# Patient Record
Sex: FEMALE | Race: OTHER | Employment: PART TIME | URBAN - METROPOLITAN AREA
[De-identification: names, ages, dates, MRNs, and addresses within clinical notes are randomized per-mention and may not be internally consistent; named-entity substitution may affect disease eponyms.]

---

## 2017-02-15 ENCOUNTER — HOSPITAL ENCOUNTER (OUTPATIENT)
Dept: MAMMOGRAPHY | Age: 32
Discharge: HOME | End: 2017-02-15
Payer: SUBSIDIZED

## 2017-02-15 DIAGNOSIS — N63.10 BREAST MASS, RIGHT: ICD-10-CM

## 2017-02-15 PROCEDURE — 76642 ULTRASOUND BREAST LIMITED: CPT

## 2017-03-07 LAB
ANTIBODY SCREEN, EXTERNAL: NEGATIVE
HBSAG, EXTERNAL: NEGATIVE
HIV, EXTERNAL: NON REACTIVE
RUBELLA, EXTERNAL: NORMAL
T. PALLIDUM, EXTERNAL: NEGATIVE
TYPE, ABO & RH, EXTERNAL: NORMAL

## 2017-06-20 LAB
HCT, EXTERNAL: 35.9
HGB, EXTERNAL: 11.7
PLATELET CNT,   EXTERNAL: 260

## 2017-07-19 ENCOUNTER — HOSPITAL ENCOUNTER (OUTPATIENT)
Age: 32
Setting detail: OBSERVATION
Discharge: HOME | End: 2017-07-19
Attending: OBSTETRICS & GYNECOLOGY | Admitting: OBSTETRICS & GYNECOLOGY
Payer: MEDICAID

## 2017-07-19 VITALS
BODY MASS INDEX: 34.02 KG/M2 | TEMPERATURE: 97.9 F | HEART RATE: 113 BPM | DIASTOLIC BLOOD PRESSURE: 61 MMHG | WEIGHT: 186 LBS | RESPIRATION RATE: 16 BRPM | SYSTOLIC BLOOD PRESSURE: 119 MMHG

## 2017-07-19 PROBLEM — M54.9 BACK PAIN AFFECTING PREGNANCY: Status: ACTIVE | Noted: 2017-07-19

## 2017-07-19 PROBLEM — O99.891 BACK PAIN AFFECTING PREGNANCY: Status: ACTIVE | Noted: 2017-07-19

## 2017-07-19 LAB
APPEARANCE UR: CLEAR
BACTERIA URNS QL MICRO: NEGATIVE /HPF
BILIRUB UR QL: NEGATIVE
COLOR UR: ABNORMAL
EPITH CASTS URNS QL MICRO: ABNORMAL /LPF
GLUCOSE UR STRIP.AUTO-MCNC: NEGATIVE MG/DL
HGB UR QL STRIP: NEGATIVE
HYALINE CASTS URNS QL MICRO: ABNORMAL /LPF (ref 0–5)
KETONES UR QL STRIP.AUTO: ABNORMAL MG/DL
LEUKOCYTE ESTERASE UR QL STRIP.AUTO: NEGATIVE
NITRITE UR QL STRIP.AUTO: NEGATIVE
PH UR STRIP: 6 [PH] (ref 5–8)
PROT UR STRIP-MCNC: NEGATIVE MG/DL
RBC #/AREA URNS HPF: ABNORMAL /HPF (ref 0–5)
SP GR UR REFRACTOMETRY: 1.02 (ref 1–1.03)
UA: UC IF INDICATED,UAUC: ABNORMAL
UROBILINOGEN UR QL STRIP.AUTO: 0.2 EU/DL (ref 0.2–1)
WBC URNS QL MICRO: ABNORMAL /HPF (ref 0–4)

## 2017-07-19 PROCEDURE — 81001 URINALYSIS AUTO W/SCOPE: CPT | Performed by: OBSTETRICS & GYNECOLOGY

## 2017-07-19 PROCEDURE — 99284 EMERGENCY DEPT VISIT MOD MDM: CPT

## 2017-07-19 RX ORDER — TRIAMCINOLONE ACETONIDE 55 UG/1
2 SPRAY, METERED NASAL DAILY
COMMUNITY
End: 2020-02-13

## 2017-07-19 NOTE — H&P
EDC:2017  EGA: 32 weeks, 4 days      CC:  Cramping and back pain. History of Present Illness:  Pt is a 28 yo  at 32+4 presenting with lower abdominal cramping and back pain that has been going on all day. She reports that she feels midline lower back pain and that she feels lower abdominal cramping that has been occurring ever 10-15 mins since this morning. She reports that it has not gotten any worse and seems to be exacerbated by position changes. She reports +FM, no VB, LOF. She denies any urinary symptoms. Pregnancy has otherwise been uncomplicated. Patient's Prenatal Care with Doctor of Record Reyes Acevedo MD Notable For -     lab screening  Normal pregnancy multigravida          Impression & Recommendations:    Problem # 1:  Back pain in pregnancy (IYP-828.40) (QJU56-S36.89)  Reviewed suspect round ligament pain + dehydration. Cervix closed. UA neg except trace ketones  NST reactive, acontractile  Discharge home. Orders:  Fetal non-stress test (CPT-15348I)      Other Orders:  L&D Outpatient Obs - Medium (CPT-AdmitF)  NST in Hospital (CPT-AdmitF)      Past Pregnancy History      :  5     Term Births:  4     Premature Births: 0     Living Children: 4     Para:   4     Mult. Births:  0     Prev : 0     Aborta:  0     Elect. Ab:  0     Spont.  Ab:  0     Ectopics:  0    Pregnancy # 1     Delivery date:   2003     Weeks Gestation: 40     Delivery type:        Delivery location:   [facility]      Infant Sex:  Female    Pregnancy # 2     Delivery date:   2005     Weeks Gestation: 40     Delivery type:        Delivery location:   [facility]     Infant Sex:  Female    Pregnancy # 3     Delivery date:   01/15/2008     Weeks Gestation: 36     Delivery type:        Delivery location:   [facility]     Infant Sex:  Female    Pregnancy # 4     Delivery date:   2013     Weeks Gestation: 40     Delivery type:        Delivery location:   [facility] Infant Sex:  Female    Pregnancy # 5     Comments:  current         Past Medical History:     Reviewed history from 03/07/2017 and no changes required:        Seasonal allergies     Past Surgical History:     Reviewed history from 03/07/2017 and no changes required:        Right breast surgery- cyst removed 2015     Family History Summary:      Reviewed history Last on 04/04/2017 and no changes required:07/19/2017  Father (Angelica Trinidad) - Has Family History of Diabetes - Entered On: 3/7/2017    General Comments - FH:  Family history transferred to [address] [place] [address]     Social History:     Reviewed history from 03/07/2017 and no changes required:                Working at "[employer]       Previous Tobacco Use: Signed On - 03/07/2017  Smoked Tobacco Use:  Never smoker  Smokeless Tobacco Use:  Never  Passive smoke exposure:  no  Drug use:  no  Caffeine use:  1 drinks per day    Previous Alcohol Use: Signed On - 03/07/2017  Alcohol use:  no  Exercise:  no  Seatbelt use:  100 %  Sun Exposure:  rarely    PAP Smear History:     Date of Last PAP Smear:  03/07/2017      Review of Systems        See HPI    Except as noted in the HPI, the review of systems is negative for General, Breast, , Resp, GI, Endo, MS, Psych and Heme.     Allergies      Medications Removed from Medication List        Flowsheet View for Follow-up Visit     Estimated weeks of        gestation:  32 4/7     Weight:  187     Blood pressure: 110 / 74     Cx Dilation:  0     Cx Effacement: 0%     Cx Station:  high     Comment:  R/O PTL      Vital Signs    Blood Pressure: 110 / 74  NST:   reactive     Height:   61.5 inches  Weight:  187 pounds      Physical Exam     General           General appearance:  no acute distress    Head           Inspection:   normal    Eyes           External:   EOM intact    ENT           Dental:   adequate dentition    Chest           Lungs:  clear to auscultation          Heart:  regular rate and rhythm    Extremeties Extremeties:  0 edema    Neurological           Reflexes:  2+ and symmetric with no pathological reflexes    Psych           Orientation:  oriented to time, place, and person          Mood:  no appearance of anxiety, depression, or agitation    Lymph           Inguinal:  no inguinal adenopathy    Skin           Inspection:  no rashes, suspicious lesions, or ulcerations    Abdomen           Abdomen:  gravid    Pelvic Exam           EGBUS:  no lesions          Vagina:  normal appearing without lesions or discharge          Uterus:  gravid          Cervix:  no lesions or discharge                  Dilation: : 0                  Effacement:  0%                  Station:  high            Impression & Recommendations:    Problem # 1:  Back pain in pregnancy (QGY-278.32) (CMG02-K97.89)  Reviewed suspect round ligament pain + dehydration. Cervix closed. UA neg except trace ketones  NST reactive, acontractile  Discharge home. Orders:  Fetal non-stress test ([facility]-83914T)      Other Orders:  L&D Outpatient Obs - Medium (CPT-AdmitF)  NST in Hospital (CPT-AdmitF)      Medications (at conclusion of this visit)    07/05/2017 BREAST PUMP MISC (MISC. DEVICES) use as directed to maintain milk supply postpartum; lactating  03/07/2017 ZOFRAN 8 MG TABS (ONDANSETRON HCL) 1 tablet every 8 hours by mouth as needed for nausea and vomiting  03/07/2017 NASACORT ALLERGY 24HR AERO (TRIAMCINOLONE ACETONIDE AERO)           LABORATORY DATA   TEST DATE RESULT   Group B Strep culture                                     (Group B Strep Culture Result Field)   Blood Type 03/07/2017 A                                             (Blood Type Result Field)   Rh 03/07/2017 Positive                                   (Rh Result Field)   Rhogam Inj Given     Tdap Vaccine Given 06/20/2017 Vacc.  [facility]   Antibody Screen 06/20/2017 Negative   Rubella  [facility] Reference Ranges On or After 3/10/14                  <0.90              Non-immune      0.90 - 0.99 Equivocal      >0.99              Immune    [facility] Reference Ranges  Before 3/10/14           <5                 Non-immune             5 - 9               Equivocal            >9                 Immune  [facility] Reference Ranges       < Or = 0.90       Negative             0.91-1.09          Equivocal            > Or = 1.10       Positive   03/07/2017     1.91     TPA (T Pallidum Antibodies) 06/20/2017 Negative   Serology (RPR)     HBsAg 03/07/2017 Negative   HIV 03/07/2017 Non Reactive   Hemoglobin 06/20/2017 11.7   Hematocrit 06/20/2017 35.9   Platelets 98/37/4941 260 X10E3/UL   TSH     Urine Culture 03/07/2017 Negative   GC DNA Probe 03/07/2017 Negative   Chlamydia DNA 03/07/2017 Negative   PAP 03/07/2017 NIL   Flu Vaccine Given 03/07/2017 Vacc. [facility]   HGBA1C     HGB Electro 03/07/2017 N/A   T4, Free     BG Fasting     GTT 1H 50G 06/20/2017 93   GTT 1H 100G     GTT 2H 100G     GTT 3H 100G     Glucose Plasma     CF Accept or Decline 03/07/2017 declined   CF Screen Result     Nuchal Trans 05/02/2017 3.43^3. 43 mm&millimeters   AFP Only 04/12/2017 *Screen Negative*   Tetra     AFP Serum     CVS 04/04/2017 declined   AFP Amniotic     Amnio Karyo 04/04/2017 declined   FISH     GC Culture     Chlamydia Cult     Ureaplasma     Mycoplasma     WBC 03/07/2017 9.1 X10E3/UL   RBC 03/07/2017 4.67 X10E6/UL   MCV 03/07/2017 86   MCH 03/07/2017 28.7   MCHC RBC 03/07/2017 33.4     ULTRASOUND DATA   TEST DATE RESULT   Estimated Fetal Weight 05/02/2017 302.49069236^947 g&grams                                     Weight % 05/02/2017 59^59% %&percent                                                BRYAN                      BPP     Cervical Length (mm)         Fetal Surveillance      NST Fetus A  An NST was performed and was reactive. The baseline FHR was 135. Moderate variablity was present. multiple accelerations of sufficient amplitude and duration were noted. Acontractile.          Electronically signed by Vinod Rosales MD on 07/19/2017 at 6:22 PM    ________________________________________________________________________

## 2017-07-19 NOTE — IP AVS SNAPSHOT
[address] 
[phone] Patient: Ewa Miranda MRN: [id number] VFQ:00/54/0064 You are allergic to the following No active allergies Recent Documentation Weight BMI OB Status Smoking Status 84.4 kg 34.02 kg/m2 Pregnant Never Smoker Emergency Contacts Name Discharge Info Relation Home Work Mobile Kelsie Petty CAREGIVER [3] Spouse [3] [phone] About your hospitalization You were admitted on:  N/A You last received care in the:  [facility] 3 LABOR & DELIVERY You were discharged on:  July 19, 2017 Unit phone number:  [phone] Why you were hospitalized Your primary diagnosis was:  Not on File Your diagnoses also included:  Back Pain Affecting Pregnancy Providers Seen During Your Hospitalizations Provider Role Specialty Primary office phone Yovana Sheppard MD Attending Provider Obstetrics & Gynecology [phone] Your Primary Care Physician (PCP) Primary Care Physician Office Phone Office Fax Ziggy Holder [phone] Follow-up Information Follow up With Details Comments Contact Info Guero Franco MD   [address] [facility] [address] [facility] 
[phone] In 1 week For follow up Current Discharge Medication List  
  
CONTINUE these medications which have NOT CHANGED Dose & Instructions Dispensing Information Comments Morning Noon Evening Bedtime PNV#16-Iron Fum & PS-FA-OM-3 35-1-200 mg Cap Your last dose was: Your next dose is: Take  by mouth. Refills:  0  
     
   
   
   
  
 triamcinolone 55 mcg nasal inhaler Commonly known as:  NASACORT AQ Your last dose was: Your next dose is:    
   
   
 Dose:  2 Spray 2 Sprays daily. Refills:  0 Discharge Instructions  DISCHARGE INSTRUCTIONS Name: Adolfo Solares YOB: 1985 Primary Diagnosis: Active Problems: 
  Back pain affecting pregnancy (2017) Introduction: You have visited the hospital because you thought you were in  labor. These guidelines are for your information at home to help prevent repeated problems. In general, you should remember: 
? Empty your bladder every 2-3 hours. ? Avoid breast stimulation (including showers where the water stream is on your breasts)-this can cause contractions. ? Rest means lying down. ? Contractions and cramping happen more often in evening and nighttime. ? No intercourse or sexual stimulation without asking your doctor. ? Try to arrange for help with housework and . Diet/Diet Restrictions:   
 
Anything you like/tolerate Physical Activity / Restrictions / Safety:  
 
* Activity at home is based on how strong your  labor has been, You should follow the following activity guidelines. As tolerated, avoid heavy lifting. Discharge Instructions/ Special Treatment/ Home Care Needs:  
 
Call your provider if: 
? Uterine cramping (menstrual-like cramps, intermittent or constant ? Uterine contractions every 10-15 minutes or more frequently ? Low abdominal pressure ( pelvic pressure) ? Dull low backache (intermittent or constant) ? Increase or change in vaginal discharge ? Feeling that the baby is \"pushing down\" ? Abdominal cramping with or without diarrhea If any of these symptoms are experienced, stop what you are doing, lie down on your side, drink two to three glasses of water and wait one hour. If the symptoms persist or get worse, call your provider.  
 
Pain Management:  
 
 
 
 
Signed By: Levi Castro MD                                                                                                   Date: 2017 Time: 6:24 PM 
 
 Discharge Checklist-NURSING TO COMPLETE:  
 
Date and Time of Discharge: Date: 7/19/2017 Time: 6:24 PM 
 
Return of:  
Dental Appliance: Dental Appliances: None Vision: Visual Aid: Glasses Hearing Aid:   
Jewelry: Jewelry: Earrings, Yolande Fujita, Ring, Watch Clothing: Clothing: At bedside, With patient Other Valuables:   
Valuables sent to safe:   
 
Prescription Given: no 
Medication Instruction Sheet(s), including side effects, provided: no 
 
Accompanied By: Family Mode of Transportation: 
 
Discharge Disposition: Home I have had the opportunity to make my options or choices for discharge. I have received and understand these instructions. These are general instructions for a healthy lifestyle: No smoking/ No tobacco products/ Avoid exposure to second hand smoke Surgeon General's Warning:  Quitting smoking now greatly reduces serious risk to your health. Obesity, smoking, and sedentary lifestyle greatly increases your risk for illness A healthy diet, regular physical exercise & weight monitoring are important for maintaining a healthy lifestyle Recognize signs and symptoms of STROKE: 
 
F-face looks uneven A-arms unable to move or move unevenly S-speech slurred or non-existent T-time-call 911 as soon as signs and symptoms begin-DO NOT go Back to bed or wait to see if you get better-TIME IS BRAIN. Weeks 32 to 34 of Your Pregnancy: Care Instructions Your Care Instructions During the last few weeks of your pregnancy, you may have more aches and pains. It's important to rest when you can. Your growing baby is putting more pressure on your bladder. So you may need to urinate more often. Hemorrhoids are also common. These are painful, itchy veins in the rectal area. In the 36th week, most women have a test for group B streptococcus (GBS). GBS is a common bacteria that can live in the vagina and rectum.  It can make your baby sick after birth. If you test positive, you will get antibiotics during labor. These will keep your baby from getting the bacteria. You may want to talk with your doctor about banking your baby's umbilical cord blood. This is the blood left in the cord after birth. If you want to save this blood, you must arrange it ahead of time. You can't decide at the last minute. If you haven't already had the Tdap shot during this pregnancy, talk to your doctor about getting it. It will help protect your  against pertussis infection. Follow-up care is a key part of your treatment and safety. Be sure to make and go to all appointments, and call your doctor if you are having problems. It's also a good idea to know your test results and keep a list of the medicines you take. How can you care for yourself at home? Ease hemorrhoids · Get more liquids, fruits, vegetables, and fiber in your diet. This will help keep your stools soft. · Avoid sitting for too long. Lie on your left side several times a day. · Clean yourself with soft, moist toilet paper. Or you can use witch hazel pads or personal hygiene pads. · If you are uncomfortable, try ice packs. Or you can sit in a warm sitz bath. Do these for 20 minutes at a time, as needed. · Use hydrocortisone cream for pain and itching. Two examples are Anusol and Preparation H Hydrocortisone. · Ask your doctor about taking an over-the-counter stool softener. Consider breastfeeding · Experts recommend that women breastfeed for 1 year or longer. Breast milk is the perfect food for babies. · Breast milk is easier for babies to digest than formula. And it is always available, just the right temperature, and free. · In general, babies who are  are healthier than formula-fed babies. ¨  babies are less likely to get ear infections, colds, diarrhea, and pneumonia.  
¨  babies who are fed only breast milk are less likely to get asthma and allergies. ¨  babies are less likely to be obese. ¨  babies are less likely to get diabetes or heart disease. · Women who breastfeed have less bleeding after the birth. Their uteruses also shrink back faster. · Some women who breastfeed lose weight faster. Making milk burns calories. · Breastfeeding can lower your risk of breast cancer, ovarian cancer, and osteoporosis. Decide about circumcision for boys · As you make this decision, it may help to think about your personal, Cheondoism, and family traditions. You get to decide if you will keep your son's penis natural or if he will be circumcised. · If you decide that you would like to have your baby circumcised, talk with your doctor. You can share your concerns about pain. And you can discuss your preferences for anesthesia. Where can you learn more? Go to http://ariadne-gwyn.info/. Enter E250 in the search box to learn more about \"Weeks 32 to 34 of Your Pregnancy: Care Instructions. \" Current as of: March 16, 2017 Content Version: 11.3 © 4811-4452 [employer]. Care instructions adapted under license by [employer] (which disclaims liability or warranty for this information). If you have questions about a medical condition or this instruction, always ask your healthcare professional. [address] any warranty or liability for your use of this information. Discharge Orders None [employer] Announcement We are excited to announce that we are making your provider's discharge notes available to you in [employer]. You will see these notes when they are completed and signed by the physician that discharged you from your recent hospital stay.   If you have any questions or concerns about any information you see in [employer], please call the Health Information Department where you were seen or reach out to your Primary Care Provider for more information about your plan of care. Introducing [facility]! Jono Galdinosabine introduces Billetto patient portal. Now you can access parts of your medical record, email your doctor's office, and request medication refills online. 1. In your internet browser, go to [email or web address] 2. Click on the First Time User? Click Here link in the Sign In box. You will see the New Member Sign Up page. 3. Enter your Billetto Access Code exactly as it appears below. You will not need to use this code after youve completed the sign-up process. If you do not sign up before the expiration date, you must request a new code. · Billetto Access Code: [email or web address] Expires: 10/17/2017  6:30 PM 
 
4. Enter the last four digits of your Social Security Number (xxxx) and Date of Birth (mm/dd/yyyy) as indicated and click Submit. You will be taken to the next sign-up page. 5. Create a Billetto ID. This will be your Billetto login ID and cannot be changed, so think of one that is secure and easy to remember. 6. Create a Billetto password. You can change your password at any time. 7. Enter your Password Reset Question and Answer. This can be used at a later time if you forget your password. 8. Enter your e-mail address. You will receive e-mail notification when new information is available in [address] 9. Click Sign Up. You can now view and download portions of your medical record. 10. Click the Download Summary menu link to download a portable copy of your medical information. If you have questions, please visit the Frequently Asked Questions section of the Billetto website. Remember, B[employer]to is NOT to be used for urgent needs. For medical emergencies, dial 911. Now available from your iPhone and Android! General Information Please provide this summary of care documentation to your next provider.  
  
  
    
    
 Patient Signature: ____________________________________________________________ Date:  ____________________________________________________________  
  
Sigmund [place] Provider Signature:  ____________________________________________________________ Date:  ____________________________________________________________

## 2017-07-19 NOTE — DISCHARGE SUMMARY
Antepartum  Discharge Summary     Patient ID:  Larence Gitelman  [id number]  89 y.o.  1985    Admit date: 7/19/2017    Discharge date: 7/19/2017    Admission Diagnoses:    Patient Active Problem List   Diagnosis Code    Fibroadenoma of right breast D24.1    Mastodynia N64.4    Back pain affecting pregnancy O26.899, M54.9       Discharge Diagnoses: There are no discharge diagnoses documented for the most recent discharge. Patient Active Problem List   Diagnosis Code    Fibroadenoma of right breast D24.1    Mastodynia N64.4    Back pain affecting pregnancy O26.899, M54.9       Procedures for this admission: UA, NST    Hospital Course: undelivered    Disposition: Home or self care    Discharged Condition: stable            Patient Instructions:   Current Discharge Medication List      CONTINUE these medications which have NOT CHANGED    Details   triamcinolone (NASACORT AQ) 55 mcg nasal inhaler 2 Sprays daily. PNV#16-Iron Fum & PS-FA-OM-3 35-1-200 mg cap Take  by mouth. Activity: Activity as tolerated  Diet: Regular Diet    Follow-up with   Follow-up Appointments   Procedures    FOLLOW UP VISIT Appointment in: One Week     Standing Status:   Standing     Number of Occurrences:   1     Order Specific Question:   Appointment in     Answer:    One Week        Signed:  Sophia Fowler MD  7/19/2017  6:25 PM

## 2017-07-19 NOTE — PROGRESS NOTES
[id number]:  # [id number] for patient history and admission requirements. Patient complaining of urinary frequency and burning with urination. 1745: UA sent to lab.    1816: Dr. Robles Pena at the bedside assessing patient and [address]. SVE closed/high. Plan is to discharge patient    [phone]: Patient discharged. All questions answered. Encouraged patient to drink lots of fluids and follow up with Dr. Marck Lima in 1 week.

## 2017-07-19 NOTE — DISCHARGE INSTRUCTIONS
DISCHARGE INSTRUCTIONS    Name: Claudia Rosales  YOB: 1985  Primary Diagnosis: Active Problems:    Back pain affecting pregnancy (2017)        Introduction: You have visited the hospital because you thought you were in  labor. These guidelines are for your information at home to help prevent repeated problems. In general, you should remember:   Empty your bladder every 2-3 hours.  Avoid breast stimulation (including showers where the water stream is on your breasts)-this can cause contractions.  Rest means lying down.  Contractions and cramping happen more often in evening and nighttime.  No intercourse or sexual stimulation without asking your doctor.  Try to arrange for help with housework and . Diet/Diet Restrictions:      Anything you like/tolerate    Physical Activity / Restrictions / Safety:     * Activity at home is based on how strong your  labor has been, You should follow the following activity guidelines. As tolerated, avoid heavy lifting. Discharge Instructions/ Special Treatment/ Home Care Needs:     Call your provider if:   Uterine cramping (menstrual-like cramps, intermittent or constant   Uterine contractions every 10-15 minutes or more frequently   Low abdominal pressure ( pelvic pressure)   Dull low backache (intermittent or constant)   Increase or change in vaginal discharge   Feeling that the baby is \"pushing down\"   Abdominal cramping with or without diarrhea  If any of these symptoms are experienced, stop what you are doing, lie down on your side, drink two to three glasses of water and wait one hour. If the symptoms persist or get worse, call your provider.     Pain Management:           Signed By: Chino Parisi MD                                                                                                   Date: 2017 Time: 6:24 PM    Discharge Checklist-NURSING TO COMPLETE:     Date and Time of Discharge: Date: 7/19/2017 Time: 6:24 PM    Return of:   Dental Appliance: Dental Appliances: None  Vision: Visual Aid: Glasses  Hearing Aid:    Jewelry: Jewelry: Earrings, Necklace, Ring, Watch  Clothing: Clothing: At bedside, With patient  Other Valuables:    Valuables sent to safe:      Prescription Given: no  Medication Instruction Sheet(s), including side effects, provided: no    Accompanied By: Family    Mode of Transportation:    Discharge Disposition: Home    I have had the opportunity to make my options or choices for discharge. I have received and understand these instructions. These are general instructions for a healthy lifestyle:    No smoking/ No tobacco products/ Avoid exposure to second hand smoke    Surgeon General's Warning:  Quitting smoking now greatly reduces serious risk to your health. Obesity, smoking, and sedentary lifestyle greatly increases your risk for illness    A healthy diet, regular physical exercise & weight monitoring are important for maintaining a healthy lifestyle    Recognize signs and symptoms of STROKE:    F-face looks uneven    A-arms unable to move or move unevenly    S-speech slurred or non-existent    T-time-call 911 as soon as signs and symptoms begin-DO NOT go       Back to bed or wait to see if you get better-TIME IS BRAIN. Weeks 32 to 34 of Your Pregnancy: Care Instructions  Your Care Instructions    During the last few weeks of your pregnancy, you may have more aches and pains. It's important to rest when you can. Your growing baby is putting more pressure on your bladder. So you may need to urinate more often. Hemorrhoids are also common. These are painful, itchy veins in the rectal area. In the 36th week, most women have a test for group B streptococcus (GBS). GBS is a common bacteria that can live in the vagina and rectum. It can make your baby sick after birth. If you test positive, you will get antibiotics during labor.  These will keep your baby from getting the bacteria. You may want to talk with your doctor about banking your baby's umbilical cord blood. This is the blood left in the cord after birth. If you want to save this blood, you must arrange it ahead of time. You can't decide at the last minute. If you haven't already had the Tdap shot during this pregnancy, talk to your doctor about getting it. It will help protect your  against pertussis infection. Follow-up care is a key part of your treatment and safety. Be sure to make and go to all appointments, and call your doctor if you are having problems. It's also a good idea to know your test results and keep a list of the medicines you take. How can you care for yourself at home? Ease hemorrhoids  · Get more liquids, fruits, vegetables, and fiber in your diet. This will help keep your stools soft. · Avoid sitting for too long. Lie on your left side several times a day. · Clean yourself with soft, moist toilet paper. Or you can use witch hazel pads or personal hygiene pads. · If you are uncomfortable, try ice packs. Or you can sit in a warm sitz bath. Do these for 20 minutes at a time, as needed. · Use hydrocortisone cream for pain and itching. Two examples are Anusol and Preparation H Hydrocortisone. · Ask your doctor about taking an over-the-counter stool softener. Consider breastfeeding  · Experts recommend that women breastfeed for 1 year or longer. Breast milk is the perfect food for babies. · Breast milk is easier for babies to digest than formula. And it is always available, just the right temperature, and free. · In general, babies who are  are healthier than formula-fed babies. ¨  babies are less likely to get ear infections, colds, diarrhea, and pneumonia. ¨  babies who are fed only breast milk are less likely to get asthma and allergies. ¨  babies are less likely to be obese.   ¨  babies are less likely to get diabetes or heart disease. · Women who breastfeed have less bleeding after the birth. Their uteruses also shrink back faster. · Some women who breastfeed lose weight faster. Making milk burns calories. · Breastfeeding can lower your risk of breast cancer, ovarian cancer, and osteoporosis. Decide about circumcision for boys  · As you make this decision, it may help to think about your personal, Church, and family traditions. You get to decide if you will keep your son's penis natural or if he will be circumcised. · If you decide that you would like to have your baby circumcised, talk with your doctor. You can share your concerns about pain. And you can discuss your preferences for anesthesia. Where can you learn more? Go to http://ariadne-gwyn.info/. Enter G380 in the search box to learn more about \"Weeks 32 to 34 of Your Pregnancy: Care Instructions. \"  Current as of: March 16, 2017  Content Version: 11.3  © 8842-3651 [email or web address]. Care instructions adapted under license by [employer] (which disclaims liability or warranty for this information). If you have questions about a medical condition or this instruction, always ask your healthcare professional. [place] [address] any warranty or liability for your use of this information.

## 2017-07-19 NOTE — IP AVS SNAPSHOT
[address] 
[phone] Patient: Mark Andrews MRN: [id number] YZE:[birth year] Current Discharge Medication List  
  
CONTINUE these medications which have NOT CHANGED Dose & Instructions Dispensing Information Comments Morning Noon Evening Bedtime PNV#16-Iron Fum & PS-FA-OM-3 35-1-200 mg Cap Your last dose was: Your next dose is: Take  by mouth. Refills:  0  
     
   
   
   
  
 triamcinolone 55 mcg nasal inhaler Commonly known as:  NASACORT AQ Your last dose was: Your next dose is:    
   
   
 Dose:  2 Spray 2 Sprays daily. Refills:  0

## 2017-08-10 ENCOUNTER — HOSPITAL ENCOUNTER (OUTPATIENT)
Age: 32
Setting detail: OBSERVATION
Discharge: HOME | End: 2017-08-10
Attending: OBSTETRICS & GYNECOLOGY | Admitting: OBSTETRICS & GYNECOLOGY
Payer: MEDICAID

## 2017-08-10 VITALS
TEMPERATURE: 97.9 F | BODY MASS INDEX: 34.23 KG/M2 | HEIGHT: 62 IN | DIASTOLIC BLOOD PRESSURE: 69 MMHG | SYSTOLIC BLOOD PRESSURE: 111 MMHG | WEIGHT: 186 LBS | RESPIRATION RATE: 16 BRPM | HEART RATE: 93 BPM

## 2017-08-10 PROBLEM — R10.9 ABDOMINAL PAIN DURING PREGNANCY IN THIRD TRIMESTER: Status: ACTIVE | Noted: 2017-08-10

## 2017-08-10 PROBLEM — O26.893 ABDOMINAL PAIN DURING PREGNANCY IN THIRD TRIMESTER: Status: ACTIVE | Noted: 2017-08-10

## 2017-08-10 PROCEDURE — 99284 EMERGENCY DEPT VISIT MOD MDM: CPT

## 2017-08-10 NOTE — IP AVS SNAPSHOT
[address] 
[phone] Patient: Lizett Puentes MRN: [id number] BMD:[birth year]/64[birth year]22 You are allergic to the following No active allergies Recent Documentation Height Weight Breastfeeding? BMI OB Status Smoking Status 1.575 m 84.4 kg No 34.02 kg/m2 Pregnant Never Smoker Emergency Contacts Name Discharge Info Relation Home Work Mobile Kellie Oliveros CAREGIVER [3] Spouse [3] [phone] About your hospitalization You were admitted on:  N/A You last received care in the:  [address] [place] [address] LABOR & DELIVERY You were discharged on:  August 10, 2017 Unit phone number:  [phone] Why you were hospitalized Your primary diagnosis was:  Not on File Your diagnoses also included:  Abdominal Pain During Pregnancy In Third Trimester Providers Seen During Your Hospitalizations Provider Role Specialty Primary office phone Tip Culp MD Attending Provider Obstetrics & Gynecology [phone] Your Primary Care Physician (PCP) Primary Care Physician Office Phone Office Fax Hima Brown [phone] Follow-up Information None Current Discharge Medication List  
  
ASK your doctor about these medications Dose & Instructions Dispensing Information Comments Morning Noon Evening Bedtime PNV#16-Iron Fum & PS-FA-OM-3 35-1-200 mg Cap Your last dose was: Your next dose is: Take  by mouth. Refills:  0  
     
   
   
   
  
 triamcinolone 55 mcg nasal inhaler Commonly known as:  NASACORT AQ Your last dose was: Your next dose is:    
   
   
 Dose:  2 Spray 2 Sprays daily. Refills:  0 Discharge Instructions Weeks 34 to 36 of Your Pregnancy: Care Instructions Your Care Instructions By now, your baby and your belly have grown quite large. It is almost time to give birth. A full-term pregnancy can deliver between 37 and 42 weeks. Your baby's lungs are almost ready to breathe air. The bones in your baby's head are now firm enough to protect it, but soft enough to move down through the birth canal. 
You may feel excited, happy, anxious, or scared. You may wonder how you will know if you are in labor or what to expect during labor. Try to be flexible in your expectations of the birth. Because each birth is different, there is no way to know exactly what childbirth will be like for you. This care sheet will help you know what to expect and how to prepare. This may make your childbirth easier. If you haven't already had the Tdap shot during this pregnancy, talk to your doctor about getting it. It will help protect your  against pertussis infection. In the 36th week, most women have a test for group B streptococcus (GBS). GBS is a common bacteria that can live in the vagina and rectum. It can make your baby sick after birth. If you test positive, you will get antibiotics during labor. The medicine will keep your baby from getting the bacteria. Follow-up care is a key part of your treatment and safety. Be sure to make and go to all appointments, and call your doctor if you are having problems. It's also a good idea to know your test results and keep a list of the medicines you take. How can you care for yourself at home? Learn about pain relief choices · Pain is different for every woman. Talk with your doctor about your feelings about pain. · You can choose from several types of pain relief. These include medicine or breathing techniques, as well as comfort measures. You can use more than one option. · If you choose to have pain medicine during labor, talk to your doctor about your options.  Some medicines lower anxiety and help with some of the pain. Others make your lower body numb so that you won't feel pain. · Be sure to tell your doctor about your pain medicine choice before you start labor or very early in your labor. You may be able to change your mind as labor progresses. · Rarely, a woman is put to sleep by medicine given through a mask or an IV. Labor and delivery · The first stage of labor has three parts: early, active, and transition. ¨ Most women have early labor at home. You can stay busy or rest, eat light snacks, drink clear fluids, and start counting contractions. ¨ When talking during a contraction gets hard, you may be moving to active labor. During active labor, you should head for the hospital if you are not there already. ¨ You are in active labor when contractions come every 3 to 4 minutes and last about 60 seconds. Your cervix is opening more rapidly. ¨ If your water breaks, contractions will come faster and stronger. ¨ During transition, your cervix is stretching, and contractions are coming more rapidly. ¨ You may want to push, but your cervix might not be ready. Your doctor will tell you when to push. · The second stage starts when your cervix is completely opened and you are ready to push. ¨ Contractions are very strong to push the baby down the birth canal. 
¨ You will feel the urge to push. You may feel like you need to have a bowel movement. ¨ You may be coached to push with contractions. These contractions will be very strong, but you will not have them as often. You can get a little rest between contractions. ¨ You may be emotional and irritable. You may not be aware of what is going on around you. ¨ One last push, and your baby is born. · The third stage is when a few more contractions push out the placenta. This may take 30 minutes or less. · The fourth stage is the welcome recovery. You may feel overwhelmed with emotions and exhausted but alert. This is a good time to start breastfeeding. Where can you learn more? Go to http://ariadne-gwyn.info/. Enter H514 in the search box to learn more about \"Weeks 34 to 36 of Your Pregnancy: Care Instructions. \" Current as of: March 16, 2017 Content Version: 11.3 © 6122-8573 [email or web address]. Care instructions adapted under license by [employer] (which disclaims liability or warranty for this information). If you have questions about a medical condition or this instruction, always ask your healthcare professional. [place] [address] any warranty or liability for your use of this information. DISCHARGE SUMMARY from Nurse The following personal items are in your possession at time of discharge: 
 
Dental Appliances: None Visual Aid: Glasses, With patient Home Medications: None Jewelry: Radha Asencio, With patient Clothing: Footwear, Undergarments, Shirt, Dress, With patient Other Valuables: Purse, With patient Personal Items Sent to Safe: none PATIENT INSTRUCTIONS: 
 
 
F-face looks uneven A-arms unable to move or move unevenly S-speech slurred or non-existent T-time-call 911 as soon as signs and symptoms begin-DO NOT go Back to bed or wait to see if you get better-TIME IS BRAIN. Warning Signs of HEART ATTACK Call 911 if you have these symptoms: 
? Chest discomfort. Most heart attacks involve discomfort in the center of the chest that lasts more than a few minutes, or that goes away and comes back. It can feel like uncomfortable pressure, squeezing, fullness, or pain. ? Discomfort in other areas of the upper body. Symptoms can include pain or discomfort in one or both arms, the back, neck, jaw, or stomach. ? Shortness of breath with or without chest discomfort. ? Other signs may include breaking out in a cold sweat, nausea, or lightheadedness. Don't wait more than five minutes to call [address]! Fast action can save your life. Calling 911 is almost always the fastest way to get lifesaving treatment. Emergency Medical Services staff can begin treatment when they arrive  up to an hour sooner than if someone gets to the hospital by car. The discharge information has been reviewed with the patient. The patient verbalized understanding. Discharge medications reviewed with the patient and appropriate educational materials and side effects teaching were provided. Discharge Orders None Introducing [facility]! [facility] introduces [employer] patient portal. Now you can access parts of your medical record, email your doctor's office, and request medication refills online. 1. In your internet browser, go to [email or web address] 2. Click on the First Time User? Click Here link in the Sign In box. You will see the New Member Sign Up page. 3. Enter your SweetPerk Access Code exactly as it appears below. You will not need to use this code after youve completed the sign-up process. If you do not sign up before the expiration date, you must request a new code. · SweetPerk Access Code: [email or web address] Expires: 10/17/2017  6:30 PM 
 
4. Enter the last four digits of your Social Security Number (xxxx) and Date of Birth (mm/dd/yyyy) as indicated and click Submit. You will be taken to the next sign-up page. 5. Create a SweetPerk ID. This will be your SweetPerk login ID and cannot be changed, so think of one that is secure and easy to remember. 6. Create a [employer]k password. You can change your password at any time. 7. Enter your Password Reset Question and Answer. This can be used at a later time if you forget your password. 8. Enter your e-mail address. You will receive e-mail notification when new information is available in [address] 9. Click Sign Up. You can now view and download portions of your medical record. 10. Click the Download Summary menu link to download a portable copy of your medical information. If you have questions, please visit the Frequently Asked Questions section of the [employer] website. Remember, [employer] is NOT to be used for urgent needs. For medical emergencies, dial 911. Now available from your iPhone and Android! General Information Please provide this summary of care documentation to your next provider. Patient Signature:  ____________________________________________________________ Date:  ____________________________________________________________  
  
Nigel Cart Provider Signature:  ____________________________________________________________ Date:  ____________________________________________________________

## 2017-08-10 NOTE — IP AVS SNAPSHOT
[address] 
[phone] Patient: Daksha Amezcua MRN: [id number] WYR:[birth year] Current Discharge Medication List  
  
ASK your doctor about these medications Dose & Instructions Dispensing Information Comments Morning Noon Evening Bedtime PNV#16-Iron Fum & PS-FA-OM-3 35-1-200 mg Cap Your last dose was: Your next dose is: Take  by mouth. Refills:  0  
     
   
   
   
  
 triamcinolone 55 mcg nasal inhaler Commonly known as:  NASACORT AQ Your last dose was: Your next dose is:    
   
   
 Dose:  2 Spray 2 Sprays daily. Refills:  0

## 2017-08-11 NOTE — H&P
Labor and Delivery Admission Note  8/10/2017    Patient is a 27 y.o.  at 35w5d who presents reporting cramping every 10-15 minutes this evening. She denies pain between cramps, vaginal bleeding, or leaking fluid. She also denies pain or odor with urination. She reports active fetal movement. She is overall comfortable and reports that she just wanted to make sure that the cramps were not a sign of  labor. PNC: Blood type: A            RH: pos            Hep B: neg             Rubella: immune            GBS status: unknown    OBHX:   OB History      Para Term  AB Living    5         SAB TAB Ectopic Molar Multiple Live Births                 Obstetric Comments    Menarche:  14. LMP: 2014. # of Children:  4. Age at Delivery of First Child:  20.   Hysterectomy/oophorectomy:  NO/NO. Breast Bx:  yes. Hx of Breast Feeding:  yes. BCP:  yes. Hormone therapy:  no. PMH:   Past Medical History:   Diagnosis Date    Breast mass, right 2015       PSH:   Past Surgical History:   Procedure Laterality Date    HX BREAST BIOPSY Right 2015    RIGHT BREAST EXCISIONAL BIOPSY W ULTRASOUND  performed by Michael Bojorquez MD at [address] BREAST BIOPSY Right     benign       OB/GYN: Brenna Leonardo    Meds:   Prior to Admission Medications   Prescriptions Last Dose Informant Patient Reported? Taking? PNV#16-Iron Fum & PS-FA-OM-3 35-1-200 mg cap 8/3/2017 at Unknown time  Yes Yes   Sig: Take  by mouth.   triamcinolone (NASACORT AQ) 55 mcg nasal inhaler 8/10/2017 at 2000  Yes Yes   Si Sprays daily.       Facility-Administered Medications: None       Allergies: No Known Allergies    Pertinent ROS: Review of Systems - General ROS: negative  Respiratory ROS: negative  Cardiovascular ROS: negative  Gastrointestinal ROS: no abdominal pain, change in bowel habits, or black or bloody stools  Genito-Urinary ROS: negative    FMH:   Family History   Problem Relation Age of Onset    Heart Attack Father        SH:   Social History     Social History    Marital status:      Spouse name: N/A    Number of children: N/A    Years of education: N/A     Occupational History    Not on file. Social History Main Topics    Smoking status: Never Smoker    Smokeless tobacco: Never Used    Alcohol use No    Drug use: No    Sexual activity: Yes     Partners: Male     Birth control/ protection: Injection     Other Topics Concern    Not on file     Social History Narrative       OBJECTIVE:    Temp (24hrs), Av.9 °F (36.6 °C), Min:97.9 °F (36.6 °C), Max:97.9 °F (36.6 °C)    Visit Vitals    /69    Pulse 93    Temp 97.9 °F (36.6 °C)    Resp 16    Ht 5' 2\" (1.575 m)    Wt 84.4 kg (186 lb)    Breastfeeding No    BMI 34.02 kg/m2       FHR baseline 140, + accels, moderate variability  Faith occasional contractions    Exam:  General: alert  HEENT:  normal   Abdomen:  Soft, non-tender  Cervix:  Closed/thick/high  Fluid: none present  Pelvimetry:  AP-good                      Arch- adequate                      Sidewalls- adequate                      Pelvis feels adequate for fetus. Extremities:  normal, no edema      Impression:  at 35w5d with occasional contractions; no evidence of  cervical dilation    Plan: PO hydration; once reactive NST is noted, patient can be discharged home with PTL and fetal movement precautions. F/u recommended as scheduled within 1-2 weeks.     Casi Walter MD  10:29 PM

## 2017-08-11 NOTE — DISCHARGE INSTRUCTIONS
Weeks 34 to 36 of Your Pregnancy: Care Instructions  Your Care Instructions    By now, your baby and your belly have grown quite large. It is almost time to give birth. A full-term pregnancy can deliver between 37 and 42 weeks. Your baby's lungs are almost ready to breathe air. The bones in your baby's head are now firm enough to protect it, but soft enough to move down through the birth canal.  You may feel excited, happy, anxious, or scared. You may wonder how you will know if you are in labor or what to expect during labor. Try to be flexible in your expectations of the birth. Because each birth is different, there is no way to know exactly what childbirth will be like for you. This care sheet will help you know what to expect and how to prepare. This may make your childbirth easier. If you haven't already had the Tdap shot during this pregnancy, talk to your doctor about getting it. It will help protect your  against pertussis infection. In the 36th week, most women have a test for group B streptococcus (GBS). GBS is a common bacteria that can live in the vagina and rectum. It can make your baby sick after birth. If you test positive, you will get antibiotics during labor. The medicine will keep your baby from getting the bacteria. Follow-up care is a key part of your treatment and safety. Be sure to make and go to all appointments, and call your doctor if you are having problems. It's also a good idea to know your test results and keep a list of the medicines you take. How can you care for yourself at home? Learn about pain relief choices  · Pain is different for every woman. Talk with your doctor about your feelings about pain. · You can choose from several types of pain relief. These include medicine or breathing techniques, as well as comfort measures. You can use more than one option. · If you choose to have pain medicine during labor, talk to your doctor about your options.  Some medicines lower anxiety and help with some of the pain. Others make your lower body numb so that you won't feel pain. · Be sure to tell your doctor about your pain medicine choice before you start labor or very early in your labor. You may be able to change your mind as labor progresses. · Rarely, a woman is put to sleep by medicine given through a mask or an IV. Labor and delivery  · The first stage of labor has three parts: early, active, and transition. ¨ Most women have early labor at home. You can stay busy or rest, eat light snacks, drink clear fluids, and start counting contractions. ¨ When talking during a contraction gets hard, you may be moving to active labor. During active labor, you should head for the hospital if you are not there already. ¨ You are in active labor when contractions come every 3 to 4 minutes and last about 60 seconds. Your cervix is opening more rapidly. ¨ If your water breaks, contractions will come faster and stronger. ¨ During transition, your cervix is stretching, and contractions are coming more rapidly. ¨ You may want to push, but your cervix might not be ready. Your doctor will tell you when to push. · The second stage starts when your cervix is completely opened and you are ready to push. ¨ Contractions are very strong to push the baby down the birth canal.  ¨ You will feel the urge to push. You may feel like you need to have a bowel movement. ¨ You may be coached to push with contractions. These contractions will be very strong, but you will not have them as often. You can get a little rest between contractions. ¨ You may be emotional and irritable. You may not be aware of what is going on around you. ¨ One last push, and your baby is born. · The third stage is when a few more contractions push out the placenta. This may take 30 minutes or less. · The fourth stage is the welcome recovery. You may feel overwhelmed with emotions and exhausted but alert.  This is a good time to start breastfeeding. Where can you learn more? Go to http://ariadne-gwyn.info/. Enter P064 in the search box to learn more about \"Weeks 34 to 36 of Your Pregnancy: Care Instructions. \"  Current as of: March 16, 2017  Content Version: 11.3  © 7149-6360 [employer]. Care instructions adapted under license by [employer] (which disclaims liability or warranty for this information). If you have questions about a medical condition or this instruction, always ask your healthcare professional. [address] any warranty or liability for your use of this information. DISCHARGE SUMMARY from Nurse    The following personal items are in your possession at time of discharge:    Dental Appliances: None  Visual Aid: Glasses, With patient     Home Medications: None  Jewelry: Ring, Earrings, With patient  Clothing: Footwear, Undergarments, Shirt, Dress, With patient  Other Valuables: Purse, With patient  Personal Items Sent to Safe: none          PATIENT INSTRUCTIONS:    After general anesthesia or intravenous sedation, for 24 hours or while taking prescription Narcotics:  · Limit your activities  · Do not drive and operate hazardous machinery  · Do not make important personal or business decisions  · Do  not drink alcoholic beverages  · If you have not urinated within 8 hours after discharge, please contact your surgeon on call.     Report the following to your surgeon:  · Excessive pain, swelling, redness or odor of or around the surgical area  · Temperature over 100.5  · Nausea and vomiting lasting longer than 4 hours or if unable to take medications  · Any signs of decreased circulation or nerve impairment to extremity: change in color, persistent  numbness, tingling, coldness or increase pain  · Any questions        What to do at Home:  Recommended activity: Activity as tolerated,     *  Please give a list of your current medications to your Primary Care Provider. *  Please update this list whenever your medications are discontinued, doses are      changed, or new medications (including over-the-counter products) are added. *  Please carry medication information at all times in case of emergency situations. These are general instructions for a healthy lifestyle:    No smoking/ No tobacco products/ Avoid exposure to second hand smoke    Surgeon General's Warning:  Quitting smoking now greatly reduces serious risk to your health. Obesity, smoking, and sedentary lifestyle greatly increases your risk for illness    A healthy diet, regular physical exercise & weight monitoring are important for maintaining a healthy lifestyle    You may be retaining fluid if you have a history of heart failure or if you experience any of the following symptoms:  Weight gain of 3 pounds or more overnight or 5 pounds in a week, increased swelling in our hands or feet or shortness of breath while lying flat in bed. Please call your doctor as soon as you notice any of these symptoms; do not wait until your next office visit. Recognize signs and symptoms of STROKE:    F-face looks uneven    A-arms unable to move or move unevenly    S-speech slurred or non-existent    T-time-call 911 as soon as signs and symptoms begin-DO NOT go       Back to bed or wait to see if you get better-TIME IS BRAIN. Warning Signs of HEART ATTACK     Call 911 if you have these symptoms:   Chest discomfort. Most heart attacks involve discomfort in the center of the chest that lasts more than a few minutes, or that goes away and comes back. It can feel like uncomfortable pressure, squeezing, fullness, or pain.  Discomfort in other areas of the upper body. Symptoms can include pain or discomfort in one or both arms, the back, neck, jaw, or stomach.  Shortness of breath with or without chest discomfort.    Other signs may include breaking out in a cold sweat, nausea, or lightheadedness. Don't wait more than five minutes to call 911 - MINUTES MATTER! Fast action can save your life. Calling 911 is almost always the fastest way to get lifesaving treatment. Emergency Medical Services staff can begin treatment when they arrive -- up to an hour sooner than if someone gets to the hospital by car. The discharge information has been reviewed with the patient. The patient verbalized understanding. Discharge medications reviewed with the patient and appropriate educational materials and side effects teaching were provided.

## 2017-08-11 NOTE — PROGRESS NOTES
2157 Pt admitted to LDR 2 with c/o painful ctxs for the last 3 hours. Denies leaking or bleeding. 550 [facility] Dr David Daigle at bedside to assess. 200 Pt ok to be d/c'd per Dr David Daigle. 2242 Pt given d/c instructions. Verbalized understanding. Left unit ambulatory with  and kids.

## 2017-08-15 LAB — GRBS, EXTERNAL: POSITIVE

## 2017-09-09 ENCOUNTER — HOSPITAL ENCOUNTER (EMERGENCY)
Age: 32
Discharge: HOME | End: 2017-09-09
Attending: OBSTETRICS & GYNECOLOGY | Admitting: OBSTETRICS & GYNECOLOGY
Payer: MEDICAID

## 2017-09-09 VITALS
SYSTOLIC BLOOD PRESSURE: 121 MMHG | TEMPERATURE: 98.4 F | BODY MASS INDEX: 36.25 KG/M2 | HEIGHT: 62 IN | DIASTOLIC BLOOD PRESSURE: 75 MMHG | WEIGHT: 197 LBS | RESPIRATION RATE: 16 BRPM | HEART RATE: 98 BPM

## 2017-09-09 LAB
A1 MICROGLOB PLACENTAL VAG QL: NEGATIVE
CONTROL LINE PRESENT?: YES
EXPIRATION DATE: NORMAL
INTERNAL NEGATIVE CONTROL: NEGATIVE
KIT LOT NO.: NORMAL

## 2017-09-09 PROCEDURE — 99285 EMERGENCY DEPT VISIT HI MDM: CPT

## 2017-09-09 PROCEDURE — 84112 EVAL AMNIOTIC FLUID PROTEIN: CPT | Performed by: OBSTETRICS & GYNECOLOGY

## 2017-09-09 NOTE — PROGRESS NOTES
1920 Patient received ambulatory to LDR # 4 under services of Dr. Abelino Argueta, Dr. Javi Sullivan on call. Patient speaks Frisian, family members state patient complains of lower abdominal and back discomfort about every 20 minutes, onset yesterday evening with increase intensity of discomfort today. 1930 Bedside and Verbal shift change report given to Lotus Larios RN (oncoming nurse) by Akilah MANUEL  (offgoing nurse). Report included the following information SBAR.

## 2017-09-09 NOTE — IP AVS SNAPSHOT
[address] 
[phone] Patient: Christiana Anaya MRN: [id number] TQQ:59/87/5834 Current Discharge Medication List  
  
ASK your doctor about these medications Dose & Instructions Dispensing Information Comments Morning Noon Evening Bedtime PNV#16-Iron Fum & PS-FA-OM-3 35-1-200 mg Cap Your last dose was: Your next dose is: Take  by mouth. Refills:  0  
     
   
   
   
  
 triamcinolone 55 mcg nasal inhaler Commonly known as:  NASACORT AQ Your last dose was: Your next dose is:    
   
   
 Dose:  2 Spray 2 Sprays daily. Refills:  0

## 2017-09-09 NOTE — IP AVS SNAPSHOT
2700 [facility] [address] [place] [address] 
[phone] Patient: Sophronia Leyden MRN: [id number] [place]:85/98/9093 You are allergic to the following No active allergies Recent Documentation Height Weight BMI OB Status Smoking Status 1.575 m 89.4 kg 36.03 kg/m2 Pregnant Never Smoker Emergency Contacts Name Discharge Info Relation Home Work Mobile Ashok Severino CAREGIVER [3] Spouse [3] [phone] About your hospitalization You were admitted on:  N/A You last received care in the:  [facility] 3 LABOR & DELIVERY You were discharged on:  September 9, 2017 Unit phone number:  [phone] Why you were hospitalized Your primary diagnosis was:  Not on File Providers Seen During Your Hospitalizations Provider Role Specialty Primary office phone Amado Landers MD Attending Provider Obstetrics & Gynecology [phone] Your Primary Care Physician (PCP) Primary Care Physician Office Phone Office Fax Shania Sutton [phone] Follow-up Information Follow up With Details Comments Contact Info Amado Landers MD  They should call you on Monday to schedule your induction [facility] [address] [place] [address] 
[phone] Current Discharge Medication List  
  
ASK your doctor about these medications Dose & Instructions Dispensing Information Comments Morning Noon Evening Bedtime PNV#16-Iron Fum & PS-FA-OM-3 35-1-200 mg Cap Your last dose was: Your next dose is: Take  by mouth. Refills:  0  
     
   
   
   
  
 triamcinolone 55 mcg nasal inhaler Commonly known as:  NASACORT AQ Your last dose was: Your next dose is:    
   
   
 Dose:  2 Spray 2 Sprays daily. Refills:  0 Discharge Instructions DISCHARGE INSTRUCTIONS Name: Sophronia Leyden YOB: 1985 Primary Diagnosis: Active Problems: * No active hospital problems. * Introduction: You have visited the hospital because you thought you were in labor. These guidelines are for your information at home to help prevent repeated problems. In general, you should remember: 
Empty your bladder every 2-3 hours. Avoid breast stimulation (including showers where the water stream is on your breasts)-this can cause contractions. Rest means lying down. Contractions and cramping happen more often in evening and nighttime. No intercourse or sexual stimulation without asking your doctor. Try to arrange for help with housework and . Discharge Instructions/ Special Treatment/ Home Care Needs:  
 
Call your provider if: 
Uterine cramping (menstrual-like cramps, intermittent or constant Uterine contractions every 3-5 minutes or more frequently Leaking of vaginal fluid (large gush) Vaginal bleeding Decreased fetal movement If any of these symptoms are experienced, stop what you are doing, lie down on your side, drink two to three glasses of water and wait one hour. If the symptoms persist or get worse, call your provider. Pain Management:  
 
 
 
 
Signed By: Blanca Somers RN                                                                                                   Date: 9/9/2017 Time: 8:45 PM 
 
Discharge Checklist-NURSING TO COMPLETE:  
 
Date and Time of Discharge: Date: 9/9/2017 Time: 8:45 PM 
 
Return of:  
Dental Appliance:   
Vision:   
Hearing Aid:   
Jewelry:   
Clothing:   
Other Valuables:   
Valuables sent to safe:   
 
Prescription Given: {yes XE:712596} Medication Instruction Sheet(s), including side effects, provided: {yes no:[id number]} Accompanied By: {OB ACCOMPANIED YV:[id number]} Mode of Transportation: 
 
Discharge Disposition: {Discharge Destination:32340::\"Home\"} I have had the opportunity to make my options or choices for discharge. I have received and understand these instructions. Week 40 of Your Pregnancy: Care Instructions Your Care Instructions By week 36, you have reached your due date. Your baby could be coming any day. But it's a good idea to think ahead to the next few weeks and what might happen. If this is your first time having a baby, try not to worry. If you don't start labor on your own by 41 or 42 weeks, your doctor may recommend giving you medicines to start labor. This care sheet gives you information about how labor can be started. It also gives you some ideas about breathing exercises you can do if you start to feel anxious or if you are trying to relax. Follow-up care is a key part of your treatment and safety. Be sure to make and go to all appointments, and call your doctor if you are having problems. It's also a good idea to know your test results and keep a list of the medicines you take. How can you care for yourself at home? Learn how labor can be started · If you and your baby are both healthy and ready, and if your cervix has started to open, your doctor may \"break your water\" (rupture the amniotic sac). This often starts labor. · If your cervix is not quite ready, you may get a medicine called Pitocin through an IV to start contractions. · If your cervix is still very firm, you may have prostaglandin tablets (misoprostol) placed in your vagina to soften the cervix. Try guided imagery to help you relax · Find a comfortable place to sit or lie down. Close your eyes. · Start by just taking a few deep breaths to help you relax. · Picture a setting that is calm and peaceful. This could be a beach, a mountain setting, a meadow, or a scene that you choose. · Imagine your scene, and try to add some detail. For example, is there a breeze? What does the kanu look like? Is it clear, or are there clouds? · It often helps to add a path to your scene. For example, as you enter the meadow, imagine a path leading you through the meadow to the trees on the other side. As you follow the path farther into the [facility] SITE you feel more and more relaxed. · When you are deep into your scene and are feeling relaxed, take a few minutes to breathe slowly and feel the calm. · When you are ready, slowly take yourself out of the scene back to the present. Tell yourself that you will feel relaxed and refreshed and will bring that sense of calm with you. · Count to 3, and open your eyes. Where can you learn more? Go to http://ariadne-gwyn.info/. Enter W902 in the search box to learn more about \"Week 40 of Your Pregnancy: Care Instructions. \" Current as of: March 16, 2017 Content Version: 11.3 © 6718-2787 3nder. Care instructions adapted under license by [employer] (which disclaims liability or warranty for this information). If you have questions about a medical condition or this instruction, always ask your healthcare professional. [address] any warranty or liability for your use of this information. Discharge Orders None Introducing [facility]! Carlene Goode introduces [address] patient portal. Now you can access parts of your medical record, email your doctor's office, and request medication refills online. 1. In your internet browser, go to [email or web address] 2. Click on the First Time User? Click Here link in the Sign In box. You will see the New Member Sign Up page. 3. Enter your [address] Access Code exactly as it appears below. You will not need to use this code after youve completed the sign-up process. If you do not sign up before the expiration date, you must request a new code. · [address] Access Code: [email or web address] Expires: 10/17/2017  6:30 PM 
 
 4. Enter the last four digits of your Social Security Number (xxxx) and Date of Birth (mm/dd/yyyy) as indicated and click Submit. You will be taken to the next sign-up page. 5. Create a iCouch ID. This will be your iCouch login ID and cannot be changed, so think of one that is secure and easy to remember. 6. Create a iCouch password. You can change your password at any time. 7. Enter your Password Reset Question and Answer. This can be used at a later time if you forget your password. 8. Enter your e-mail address. You will receive e-mail notification when new information is available in [address] 9. Click Sign Up. You can now view and download portions of your medical record. 10. Click the Download Summary menu link to download a portable copy of your medical information. If you have questions, please visit the Frequently Asked Questions section of the iCouch website. Remember, iCouch is NOT to be used for urgent needs. For medical emergencies, dial 911. Now available from your iPhone and Android! General Information Please provide this summary of care documentation to your next provider. Patient Signature:  ____________________________________________________________ Date:  ____________________________________________________________  
  
Pablo Cordova Provider Signature:  ____________________________________________________________ Date:  ____________________________________________________________

## 2017-09-10 NOTE — PROGRESS NOTES
@3299 Dr. Bard Carrera at bedside    @6638 Patient discharged home in stable condition. All questions answered. Accompanied by family.

## 2017-09-10 NOTE — DISCHARGE INSTRUCTIONS
DISCHARGE INSTRUCTIONS    Name: Jessica Cole  YOB: 1985  Primary Diagnosis: Active Problems:    * No active hospital problems. *      Introduction: You have visited the hospital because you thought you were in labor. These guidelines are for your information at home to help prevent repeated problems. In general, you should remember:  Empty your bladder every 2-3 hours. Avoid breast stimulation (including showers where the water stream is on your breasts)-this can cause contractions. Rest means lying down. Contractions and cramping happen more often in evening and nighttime. No intercourse or sexual stimulation without asking your doctor. Try to arrange for help with housework and . Discharge Instructions/ Special Treatment/ Home Care Needs:     Call your provider if:  Uterine cramping (menstrual-like cramps, intermittent or constant  Uterine contractions every 3-5 minutes or more frequently  Leaking of vaginal fluid (large gush)  Vaginal bleeding  Decreased fetal movement    If any of these symptoms are experienced, stop what you are doing, lie down on your side, drink two to three glasses of water and wait one hour. If the symptoms persist or get worse, call your provider. Pain Management:           Signed By: Deana Young RN                                                                                                   Date: 9/9/2017 Time: 8:45 PM    Discharge Checklist-NURSING TO COMPLETE:     Date and Time of Discharge: Date: 9/9/2017 Time: 8:45 PM    Return of:   Dental Appliance:    Vision:    Hearing Aid:    Jewelry:    Clothing:    Other Valuables:    Valuables sent to safe:      Prescription Given: no  Medication Instruction Sheet(s), including side effects, provided: no    Accompanied By: Family    Mode of Transportation:    Discharge Disposition: Home    I have had the opportunity to make my options or choices for discharge.  I have received and understand these instructions. Week 40 of Your Pregnancy: Care Instructions  Your Care Instructions    By week 40, you have reached your due date. Your baby could be coming any day. But it's a good idea to think ahead to the next few weeks and what might happen. If this is your first time having a baby, try not to worry. If you don't start labor on your own by 41 or 42 weeks, your doctor may recommend giving you medicines to start labor. This care sheet gives you information about how labor can be started. It also gives you some ideas about breathing exercises you can do if you start to feel anxious or if you are trying to relax. Follow-up care is a key part of your treatment and safety. Be sure to make and go to all appointments, and call your doctor if you are having problems. It's also a good idea to know your test results and keep a list of the medicines you take. How can you care for yourself at home? Learn how labor can be started  · If you and your baby are both healthy and ready, and if your cervix has started to open, your doctor may \"break your water\" (rupture the amniotic sac). This often starts labor. · If your cervix is not quite ready, you may get a medicine called Pitocin through an IV to start contractions. · If your cervix is still very firm, you may have prostaglandin tablets (misoprostol) placed in your vagina to soften the cervix. Try guided imagery to help you relax  · Find a comfortable place to sit or lie down. Close your eyes. · Start by just taking a few deep breaths to help you relax. · Picture a setting that is calm and peaceful. This could be a beach, a mountain setting, a meadow, or a scene that you choose. · Imagine your scene, and try to add some detail. For example, is there a breeze? What does the kanu look like? Is it clear, or are there clouds? · It often helps to add a path to your scene.  For example, as you enter the meaw, imagine a path leading you through the [facility] to the trees on the other side. As you follow the path farther into the [facility] you feel more and more relaxed. · When you are deep into your scene and are feeling relaxed, take a few minutes to breathe slowly and feel the calm. · When you are ready, slowly take yourself out of the scene back to the present. Tell yourself that you will feel relaxed and refreshed and will bring that sense of calm with you. · Count to 3, and open your eyes. Where can you learn more? Go to http://ariadne-gwyn.info/. Enter Y621 in the search box to learn more about \"Week 40 of Your Pregnancy: Care Instructions. \"  Current as of: March 16, 2017  Content Version: 11.3  © 4266-4351 [employer]. Care instructions adapted under license by [employer] (which disclaims liability or warranty for this information). If you have questions about a medical condition or this instruction, always ask your healthcare professional. [place] [address] any warranty or liability for your use of this information.

## 2017-09-10 NOTE — H&P
Labor and Delivery Admission Note  2017    27 y.o., Stacey, female, G5 P 4004 Estimated Date of Delivery: 17 by dates and US presents at 36 0/7 weeks  with contractions every 20 minutes. She denies LOF, VB. She has good fetal movement. She arrives to  and D at 1923      [facility]: Blood type: A            RH: pos            Rubella: immune            SVII serology: neg             GBS status: positive    POB:  NSVDX4, all female  G5: current, no complications    Pgyn:  No STD  No abnormal paps      Past Medical History:   Diagnosis Date    Breast disorder     Breast mass, right 2015     Past Surgical History:   Procedure Laterality Date    HX BREAST BIOPSY Right 2015    RIGHT BREAST EXCISIONAL BIOPSY W ULTRASOUND  performed by Hermes Shane MD at [address] [place] [address] Right     benign     OB/GYN: Danelle Sanabria      Meds:   No current facility-administered medications for this encounter. Allergies: No Known Allergies       Pertinent ROS: see HPI, otherwise negative       Family History   Problem Relation Age of Onset    Heart Attack Father      Social History     Social History    Marital status:      Spouse name: N/A    Number of children: N/A    Years of education: N/A     Occupational History    Not on file.      Social History Main Topics    Smoking status: Never Smoker    Smokeless tobacco: Never Used    Alcohol use No    Drug use: No    Sexual activity: Yes     Partners: Male     Birth control/ protection: Injection     Other Topics Concern    Not on file     Social History Narrative       OBJECTIVE:  Gravid Stacey, female NAD  Temp (24hrs), Av.4 °F (36.9 °C), Min:98.4 °F (36.9 °C), Max:98.4 °F (36.9 °C)    Visit Vitals    /75 (BP 1 Location: Left arm, BP Patient Position: At rest)    Pulse 98    Temp 98.4 °F (36.9 °C)    Resp 16    Ht 5' 2\" (1.575 m)    Wt 89.4 kg (197 lb)    BMI 36.03 kg/m2             Exam:  HEENT:  normal Lungs:  clear  Cor:  RRR  Abdomen:  Fundal height c/w GA                    Soft  Non tender, non distended                    Clinical EFW 7 1/2#  Fetal heart rate tracing:  Reactive, Cat I  Contraction pattern: occasional  Cervix:  1/long/high  Fluid:  Intact  Pelvimetry:  AP-good                      Arch- adequate                      Sidewalls- adequate                      Pelvis feels adequate for fetus. Impression:  IUP at 40 0/7 weeks with irregular miild contractions.   No e/o labor, no e/o ROM    Plan: f/u with regular appt, labor precautions given      :Becka Pereyra MD

## 2017-09-12 ENCOUNTER — ANESTHESIA (OUTPATIENT)
Dept: LABOR AND DELIVERY | Age: 32
DRG: 560 | End: 2017-09-12
Payer: MEDICAID

## 2017-09-12 ENCOUNTER — ANESTHESIA EVENT (OUTPATIENT)
Dept: LABOR AND DELIVERY | Age: 32
DRG: 560 | End: 2017-09-12
Payer: MEDICAID

## 2017-09-12 ENCOUNTER — HOSPITAL ENCOUNTER (INPATIENT)
Age: 32
LOS: 2 days | Discharge: HOME | DRG: 560 | End: 2017-09-14
Attending: OBSTETRICS & GYNECOLOGY | Admitting: OBSTETRICS & GYNECOLOGY
Payer: MEDICAID

## 2017-09-12 PROBLEM — Z34.90 PREGNANCY: Status: ACTIVE | Noted: 2017-09-12

## 2017-09-12 LAB
ERYTHROCYTE [DISTWIDTH] IN BLOOD BY AUTOMATED COUNT: 15 % (ref 11.5–14.5)
HCT VFR BLD AUTO: 36.5 % (ref 35–47)
HGB BLD-MCNC: 11.9 G/DL (ref 11.5–16)
MCH RBC QN AUTO: 27.8 PG (ref 26–34)
MCHC RBC AUTO-ENTMCNC: 32.6 G/DL (ref 30–36.5)
MCV RBC AUTO: 85.3 FL (ref 80–99)
PLATELET # BLD AUTO: 252 K/UL (ref 150–400)
RBC # BLD AUTO: 4.28 M/UL (ref 3.8–5.2)
WBC # BLD AUTO: 9.2 K/UL (ref 3.6–11)

## 2017-09-12 PROCEDURE — 3E033VJ INTRODUCTION OF OTHER HORMONE INTO PERIPHERAL VEIN, PERCUTANEOUS APPROACH: ICD-10-PCS | Performed by: OBSTETRICS & GYNECOLOGY

## 2017-09-12 PROCEDURE — 75410000003 HC RECOV DEL/VAG/CSECN EA 0.5 HR

## 2017-09-12 PROCEDURE — 00HU33Z INSERTION OF INFUSION DEVICE INTO SPINAL CANAL, PERCUTANEOUS APPROACH: ICD-10-PCS | Performed by: ANESTHESIOLOGY

## 2017-09-12 PROCEDURE — 75410000002 HC LABOR FEE PER 1 HR

## 2017-09-12 PROCEDURE — 76060000078 HC EPIDURAL ANESTHESIA

## 2017-09-12 PROCEDURE — 85027 COMPLETE CBC AUTOMATED: CPT | Performed by: OBSTETRICS & GYNECOLOGY

## 2017-09-12 PROCEDURE — A4300 CATH IMPL VASC ACCESS PORTAL: HCPCS

## 2017-09-12 PROCEDURE — 75410000000 HC DELIVERY VAGINAL/SINGLE

## 2017-09-12 PROCEDURE — 36415 COLL VENOUS BLD VENIPUNCTURE: CPT | Performed by: OBSTETRICS & GYNECOLOGY

## 2017-09-12 PROCEDURE — 77030011943

## 2017-09-12 PROCEDURE — 65410000002 HC RM PRIVATE OB

## 2017-09-12 PROCEDURE — 77030014125 HC TY EPDRL BBMI -B: Performed by: ANESTHESIOLOGY

## 2017-09-12 PROCEDURE — 74011250636 HC RX REV CODE- 250/636: Performed by: ANESTHESIOLOGY

## 2017-09-12 PROCEDURE — 10907ZC DRAINAGE OF AMNIOTIC FLUID, THERAPEUTIC FROM PRODUCTS OF CONCEPTION, VIA NATURAL OR ARTIFICIAL OPENING: ICD-10-PCS | Performed by: OBSTETRICS & GYNECOLOGY

## 2017-09-12 PROCEDURE — 74011250636 HC RX REV CODE- 250/636: Performed by: OBSTETRICS & GYNECOLOGY

## 2017-09-12 PROCEDURE — 74011000250 HC RX REV CODE- 250

## 2017-09-12 PROCEDURE — 74011000258 HC RX REV CODE- 258: Performed by: OBSTETRICS & GYNECOLOGY

## 2017-09-12 PROCEDURE — 77030007880 HC KT SPN EPDRL BBMI -B

## 2017-09-12 RX ORDER — SODIUM CHLORIDE 0.9 % (FLUSH) 0.9 %
SYRINGE (ML) INJECTION
Status: DISPENSED
Start: 2017-09-12 | End: 2017-09-12

## 2017-09-12 RX ORDER — FENTANYL/BUPIVACAINE/NS/PF 2-1250MCG
1-16 PREFILLED PUMP RESERVOIR EPIDURAL CONTINUOUS
Status: DISCONTINUED | OUTPATIENT
Start: 2017-09-12 | End: 2017-09-12 | Stop reason: HOSPADM

## 2017-09-12 RX ORDER — ACETAMINOPHEN 325 MG/1
650 TABLET ORAL
Status: DISCONTINUED | OUTPATIENT
Start: 2017-09-12 | End: 2017-09-14 | Stop reason: HOSPADM

## 2017-09-12 RX ORDER — SWAB
1 SWAB, NON-MEDICATED MISCELLANEOUS DAILY
Status: DISCONTINUED | OUTPATIENT
Start: 2017-09-13 | End: 2017-09-14 | Stop reason: HOSPADM

## 2017-09-12 RX ORDER — OXYTOCIN/RINGER'S LACTATE 20/1000 ML
125-1000 PLASTIC BAG, INJECTION (ML) INTRAVENOUS AS NEEDED
Status: DISCONTINUED | OUTPATIENT
Start: 2017-09-12 | End: 2017-09-14 | Stop reason: HOSPADM

## 2017-09-12 RX ORDER — SODIUM CHLORIDE 0.9 % (FLUSH) 0.9 %
5-10 SYRINGE (ML) INJECTION AS NEEDED
Status: DISCONTINUED | OUTPATIENT
Start: 2017-09-12 | End: 2017-09-14 | Stop reason: HOSPADM

## 2017-09-12 RX ORDER — NALBUPHINE HYDROCHLORIDE 10 MG/ML
10 INJECTION, SOLUTION INTRAMUSCULAR; INTRAVENOUS; SUBCUTANEOUS
Status: DISCONTINUED | OUTPATIENT
Start: 2017-09-12 | End: 2017-09-12 | Stop reason: HOSPADM

## 2017-09-12 RX ORDER — BUPIVACAINE HYDROCHLORIDE 5 MG/ML
30 INJECTION, SOLUTION EPIDURAL; INTRACAUDAL AS NEEDED
Status: DISCONTINUED | OUTPATIENT
Start: 2017-09-12 | End: 2017-09-12 | Stop reason: HOSPADM

## 2017-09-12 RX ORDER — SIMETHICONE 80 MG
80 TABLET,CHEWABLE ORAL
Status: DISCONTINUED | OUTPATIENT
Start: 2017-09-12 | End: 2017-09-14 | Stop reason: HOSPADM

## 2017-09-12 RX ORDER — IBUPROFEN 400 MG/1
800 TABLET ORAL EVERY 8 HOURS
Status: DISCONTINUED | OUTPATIENT
Start: 2017-09-12 | End: 2017-09-14 | Stop reason: HOSPADM

## 2017-09-12 RX ORDER — ONDANSETRON 2 MG/ML
8 INJECTION INTRAMUSCULAR; INTRAVENOUS
Status: DISCONTINUED | OUTPATIENT
Start: 2017-09-12 | End: 2017-09-12 | Stop reason: HOSPADM

## 2017-09-12 RX ORDER — OXYTOCIN IN 5 % DEXTROSE 30/500 ML
2-25 PLASTIC BAG, INJECTION (ML) INTRAVENOUS
Status: DISCONTINUED | OUTPATIENT
Start: 2017-09-12 | End: 2017-09-12 | Stop reason: HOSPADM

## 2017-09-12 RX ORDER — HYDROCORTISONE 1 %
CREAM (GRAM) TOPICAL AS NEEDED
Status: DISCONTINUED | OUTPATIENT
Start: 2017-09-12 | End: 2017-09-14 | Stop reason: HOSPADM

## 2017-09-12 RX ORDER — PEPPERMINT OIL
SPIRIT ORAL
Status: DISPENSED | OUTPATIENT
Start: 2017-09-12 | End: 2017-09-13

## 2017-09-12 RX ORDER — BUPIVACAINE HYDROCHLORIDE 5 MG/ML
INJECTION, SOLUTION EPIDURAL; INTRACAUDAL AS NEEDED
Status: DISCONTINUED | OUTPATIENT
Start: 2017-09-12 | End: 2017-09-12 | Stop reason: HOSPADM

## 2017-09-12 RX ORDER — FENTANYL CITRATE 50 UG/ML
100 INJECTION, SOLUTION INTRAMUSCULAR; INTRAVENOUS ONCE
Status: DISCONTINUED | OUTPATIENT
Start: 2017-09-12 | End: 2017-09-12 | Stop reason: HOSPADM

## 2017-09-12 RX ORDER — FENTANYL CITRATE 50 UG/ML
INJECTION, SOLUTION INTRAMUSCULAR; INTRAVENOUS AS NEEDED
Status: DISCONTINUED | OUTPATIENT
Start: 2017-09-12 | End: 2017-09-12 | Stop reason: HOSPADM

## 2017-09-12 RX ORDER — LIDOCAINE HYDROCHLORIDE AND EPINEPHRINE 15; 5 MG/ML; UG/ML
INJECTION, SOLUTION EPIDURAL AS NEEDED
Status: DISCONTINUED | OUTPATIENT
Start: 2017-09-12 | End: 2017-09-12 | Stop reason: HOSPADM

## 2017-09-12 RX ORDER — FENTANYL CITRATE 50 UG/ML
50 INJECTION, SOLUTION INTRAMUSCULAR; INTRAVENOUS
Status: DISCONTINUED | OUTPATIENT
Start: 2017-09-12 | End: 2017-09-12 | Stop reason: HOSPADM

## 2017-09-12 RX ORDER — HYDROCORTISONE ACETATE PRAMOXINE HCL 2.5; 1 G/100G; G/100G
CREAM TOPICAL AS NEEDED
Status: DISCONTINUED | OUTPATIENT
Start: 2017-09-12 | End: 2017-09-14 | Stop reason: HOSPADM

## 2017-09-12 RX ORDER — SODIUM CHLORIDE, SODIUM LACTATE, POTASSIUM CHLORIDE, CALCIUM CHLORIDE 600; 310; 30; 20 MG/100ML; MG/100ML; MG/100ML; MG/100ML
125 INJECTION, SOLUTION INTRAVENOUS CONTINUOUS
Status: DISCONTINUED | OUTPATIENT
Start: 2017-09-12 | End: 2017-09-14 | Stop reason: HOSPADM

## 2017-09-12 RX ORDER — TERBUTALINE SULFATE 1 MG/ML
0.25 INJECTION SUBCUTANEOUS AS NEEDED
Status: DISCONTINUED | OUTPATIENT
Start: 2017-09-12 | End: 2017-09-12 | Stop reason: HOSPADM

## 2017-09-12 RX ORDER — SODIUM CHLORIDE 0.9 % (FLUSH) 0.9 %
5-10 SYRINGE (ML) INJECTION EVERY 8 HOURS
Status: DISCONTINUED | OUTPATIENT
Start: 2017-09-12 | End: 2017-09-14 | Stop reason: HOSPADM

## 2017-09-12 RX ORDER — AMMONIA 15 % (W/V)
1 AMPUL (EA) INHALATION AS NEEDED
Status: DISCONTINUED | OUTPATIENT
Start: 2017-09-12 | End: 2017-09-14 | Stop reason: HOSPADM

## 2017-09-12 RX ORDER — DOCUSATE SODIUM 100 MG/1
100 CAPSULE, LIQUID FILLED ORAL
Status: DISCONTINUED | OUTPATIENT
Start: 2017-09-12 | End: 2017-09-14 | Stop reason: HOSPADM

## 2017-09-12 RX ORDER — NALOXONE HYDROCHLORIDE 0.4 MG/ML
0.4 INJECTION, SOLUTION INTRAMUSCULAR; INTRAVENOUS; SUBCUTANEOUS AS NEEDED
Status: DISCONTINUED | OUTPATIENT
Start: 2017-09-12 | End: 2017-09-12 | Stop reason: HOSPADM

## 2017-09-12 RX ORDER — SODIUM CHLORIDE 0.9 % (FLUSH) 0.9 %
SYRINGE (ML) INJECTION AS NEEDED
Status: DISCONTINUED | OUTPATIENT
Start: 2017-09-12 | End: 2017-09-12 | Stop reason: HOSPADM

## 2017-09-12 RX ORDER — OXYCODONE AND ACETAMINOPHEN 5; 325 MG/1; MG/1
1 TABLET ORAL
Status: DISCONTINUED | OUTPATIENT
Start: 2017-09-12 | End: 2017-09-14 | Stop reason: HOSPADM

## 2017-09-12 RX ORDER — MINERAL OIL
OIL (ML) ORAL
Status: DISPENSED
Start: 2017-09-12 | End: 2017-09-13

## 2017-09-12 RX ADMIN — BUPIVACAINE HYDROCHLORIDE 3 ML: 5 INJECTION, SOLUTION EPIDURAL; INTRACAUDAL at 13:44

## 2017-09-12 RX ADMIN — AMPICILLIN SODIUM 2 G: 2 INJECTION, POWDER, FOR SOLUTION INTRAMUSCULAR; INTRAVENOUS at 08:38

## 2017-09-12 RX ADMIN — FENTANYL 0.2 MG/100ML-BUPIV 0.125%-NACL 0.9% EPIDURAL INJ 10 ML/HR: 2/0.125 SOLUTION at 13:54

## 2017-09-12 RX ADMIN — AMPICILLIN SODIUM 1 G: 1 INJECTION, POWDER, FOR SOLUTION INTRAMUSCULAR; INTRAVENOUS at 14:00

## 2017-09-12 RX ADMIN — Medication 2 MILLI-UNITS/MIN: at 09:00

## 2017-09-12 RX ADMIN — FENTANYL CITRATE 100 MCG: 50 INJECTION, SOLUTION INTRAMUSCULAR; INTRAVENOUS at 13:46

## 2017-09-12 RX ADMIN — SODIUM CHLORIDE, SODIUM LACTATE, POTASSIUM CHLORIDE, AND CALCIUM CHLORIDE 125 ML/HR: 600; 310; 30; 20 INJECTION, SOLUTION INTRAVENOUS at 15:27

## 2017-09-12 RX ADMIN — LIDOCAINE HYDROCHLORIDE AND EPINEPHRINE 4.5 ML: 15; 5 INJECTION, SOLUTION EPIDURAL at 13:44

## 2017-09-12 RX ADMIN — Medication 10 ML: at 13:48

## 2017-09-12 RX ADMIN — AMPICILLIN SODIUM 1 G: 1 INJECTION, POWDER, FOR SOLUTION INTRAMUSCULAR; INTRAVENOUS at 17:56

## 2017-09-12 RX ADMIN — SODIUM CHLORIDE, SODIUM LACTATE, POTASSIUM CHLORIDE, AND CALCIUM CHLORIDE 999 ML/HR: 600; 310; 30; 20 INJECTION, SOLUTION INTRAVENOUS at 13:22

## 2017-09-12 NOTE — PROGRESS NOTES
1250-Bedside and Verbal shift change report given to ELIDIA Gimenez RN (oncoming nurse) by Braxton Yu. Javi Murphy RN (offgoing nurse). Report included the following information SBAR, MAR and Accordion. 1300-Dr Sharma at bedside, viewed strip. SVE done. Unable to determine dilation because patient not tolerating exam well. Is asking for an epidural.  Bedside US done to confirm vertex presentation. Will call Dr Rebeca Jacinto once epidural in is place and she will do SVE. 1330-Dr Rosa Trevino called for epidural placement. 1600-Dr Sharma viewed strip, aware of decels post AROM and pit off x 30 minutes. Aware pit has been restarted and currently at 4mu/min. 1650-DR Sharma viewed strip. Aware patient is still on 4mu of pitocin. No new orders at this time. 1840-Pt feeling pain in lower abdomen. PCEA button given and pt instructed on use. Will do straight cath then SVE. 1842-Dr Sharma aware of SVE of 8 cms. Pt declines pressure or urge to push at this time. 1850-Pt feeling urge to push. DR Rebeca Jacinto finishing another delivery. Dr Saleem Lockwood aware. Pt encouraged not to push, support offered. 1857-Dr Sharma available for delivery when patient is ready. 1917-Dr Sharma at bedside, viewed strip. Will call when ready for delivery. 1930-Bedside and Verbal shift change report given to GHULAM Sandoval RN (oncoming nurse) by Braxton Yu. Simone Gimenez RN (offgoing nurse). Report included the following information SBAR, MAR and Accordion.

## 2017-09-12 NOTE — IP AVS SNAPSHOT
[address] [facility] [address] [place] [address] 
[phone] Patient: Kishan Cullen MRN: [id number] AMH:08/93/0790 You are allergic to the following No active allergies Recent Documentation Height Weight Breastfeeding? BMI OB Status Smoking Status 1.575 m 89.4 kg Unknown 36.03 kg/m2 Recent pregnancy Never Smoker Unresulted Labs Order Current Status SAMPLE TO BLOOD BANK In process Emergency Contacts Name Discharge Info Relation Home Work Mobile Birtha Kathy CAREGIVER [3] Spouse [3] [phone] About your hospitalization You were admitted on:  September 12, 2017 You last received care in the:  [address] [facility] You were discharged on:  September 14, 2017 Unit phone number:  [phone] Why you were hospitalized Your primary diagnosis was:  Not on File Your diagnoses also included:  Pregnancy Providers Seen During Your Hospitalizations Provider Role Specialty Primary office phone Angelica Zuniga MD Attending Provider Obstetrics & Gynecology [phone] Dominga Hernandez MD Attending Provider Obstetrics & Gynecology [phone] Your Primary Care Physician (PCP) Primary Care Physician Office Phone Office Fax Mabel Thakkar [phone] Follow-up Information Follow up With Details Comments Contact Info Meme Barker MD   [address] [facility] [address] [place] [address] 
[phone] Current Discharge Medication List  
  
START taking these medications Dose & Instructions Dispensing Information Comments Morning Noon Evening Bedtime  
 ibuprofen 600 mg tablet Commonly known as:  MOTRIN Your last dose was: Your next dose is:    
   
   
 Dose:  600 mg Take 1 Tab by mouth every six (6) hours as needed for Pain. Quantity:  30 Tab Refills:  1 oxyCODONE-acetaminophen 5-325 mg per tablet Commonly known as:  PERCOCET Your last dose was: Your next dose is:    
   
   
 Dose:  1 Tab Take 1 Tab by mouth every six (6) hours as needed. Max Daily Amount: 4 Tabs. Quantity:  30 Tab Refills:  0 CONTINUE these medications which have NOT CHANGED Dose & Instructions Dispensing Information Comments Morning Noon Evening Bedtime PNV#16-Iron Fum & PS-FA-OM-3 35-1-200 mg Cap Your last dose was: Your next dose is: Take  by mouth. Refills:  0  
     
   
   
   
  
 triamcinolone 55 mcg nasal inhaler Commonly known as:  NASACORT AQ Your last dose was: Your next dose is:    
   
   
 Dose:  2 Spray 2 Sprays daily. Refills:  0 Where to Get Your Medications Information on where to get these meds will be given to you by the nurse or doctor. ! Ask your nurse or doctor about these medications  
  ibuprofen 600 mg tablet  
 oxyCODONE-acetaminophen 5-325 mg per tablet Discharge Instructions POSTPARTUM DISCHARGE INSTRUCTIONS Name:  Venida  YOB: 1985 Admission Diagnosis:  Labor and Delivery Pregnancy Discharge Diagnosis:   
Problem List as of 9/13/2017  Date Reviewed: 2/29/2016 Codes Class Noted - Resolved Pregnancy ICD-10-CM: Z33.1 ICD-9-CM: V22.2  9/12/2017 - Present Abdominal pain during pregnancy in third trimester ICD-10-CM: O26.893, R10.9 ICD-9-CM: 646.83, 789.00  8/10/2017 - Present Back pain affecting pregnancy ICD-10-CM: O26.899, M54.9 ICD-9-CM: 646.80, 724.5  7/19/2017 - Present Mastodynia ICD-10-CM: N64.4 ICD-9-CM: 611.71  2/29/2016 - Present Fibroadenoma of right breast ICD-10-CM: D24.1 ICD-9-CM: 246  1/19/2016 - Present Attending Physician:  Josue Diaz MD 
 
 Delivery Type:  Vaginal Childbirth: What To Expect At Home Your Recovery: Your body will slowly heal in the next few weeks. It is easy to get too tired and overwhelmed during the first weeks after your baby is born. Changes in your hormones can shift your mood without warning. You may find it hard to meet the extra demands on your energy and time. Take it easy on yourself. Follow-up care is a key part of your treatment and safety. Be sure to make and go to all appointments, and call your doctor if you are having problems. It's also a good idea to know your test results and keep a list of the medicines you take. How can you care for yourself at home? Vaginal bleeding and cramps · After delivery, you will have a bloody discharge from the vagina. This will turn pink within a week and then white or yellow after about 10 days. It may last for 2 to 4 weeks or longer, until the uterus has healed. Use pads instead of tampons until you stop bleeding. · Do not worry if you pass some blood clots, as long as they are smaller than a golf ball. If you have a tear or stitches in your vaginal area, change the pad at least every 4 hours to prevent soreness and infection. · You may have cramps for the first few days after childbirth. These are normal and occur as the uterus shrinks to normal size. Take an over-the-counter pain medicine, such as acetaminophen (Tylenol), ibuprofen (Advil, Motrin), or naproxen (Aleve), for cramps. Read and follow all instructions on the label. Do not take aspirin, because it can cause more bleeding. Do not take acetaminophen (Tylenol) and other acetaminophen containing medications (i.e. Percocet) at the same time. Breast fullness · Your breasts may overfill (engorge) in the first few days after delivery. To help milk flow and to relieve pain, warm your breasts in the shower or by using warm, moist towels before nursing. · If you are not nursing, do not put warmth on your breasts or touch your breasts. Wear a tight bra or sports bra and use ice until the fullness goes away. This usually takes 2 to 3 days. · Put ice or a cold pack on your breast after nursing to reduce swelling and pain. Put a thin cloth between the ice and your skin. Activity · Eat a balanced diet. Do not try to lose weight by cutting calories. Keep taking your prenatal vitamins, or take a multivitamin. · Get as much rest as you can. Try to take naps when your baby sleeps during the day. · Get some exercise every day. But do not do any heavy exercise until your doctor says it is okay. · Wait until you are healed (about 4 to 6 weeks) before you have sexual intercourse. Your doctor will tell you when it is okay to have sex. · Talk to your doctor about birth control. You can get pregnant even before your period returns. Also, you can get pregnant while you are breast-feeding. Mental Health · Many women get the \"baby blues\" during the first few days after childbirth. You may lose sleep, feel irritable, and cry easily. You may feel happy one minute and sad the next. Hormone changes are one cause of these emotional changes. Also, the demands of a new baby, along with visits from relatives or other family needs, add to a mother's stress. The \"baby blues\" often peak around the fourth day. Then they ease up in less than 2 weeks. · If your moodiness or anxiety lasts for more than 2 weeks, or if you feel like life is not worth living, you may have postpartum depression. This is different for each mother. Some mothers with serious depression may worry intensely about their infant's well-being. Others may feel distant from their child. Some mothers might even feel that they might harm their baby. A mother may have signs of paranoia, wondering if someone is watching her.  
· With all the changes in your life, you may not know if you are depressed. Pregnancy sometimes causes changes in how you feel that are similar to the symptoms of depression. · Symptoms of depression include: · Feeling sad or hopeless and losing interest in daily activities. These are the most common symptoms of depression. · Sleeping too much or not enough. · Feeling tired. You may feel as if you have no energy. · Eating too much or too little. · POSTPARTUM SUPPORT INTERNATIONAL (PSI) offers a Warm line; Chat with the Expert phone sessions; Information and Articles about Pregnancy and Postpartum Mood Disorders; Comprehensive List of Free Support Groups; Knowledgeable local coordinators who will offer support, information, and resources; Guide to Resources on [employer]; Calendar of events in the  mood disorders community; Latest News and Research; and [facility] & [facility] [address] for [employer]. Remember - You are not alone; You are not to blame; With help, you will be well. 3-315-220-PPD(4773). WWW. POSTPARTUM. NET · Writing or talking about death, such as writing suicide notes or talking about guns, knives, or pills. Keep the numbers for these national suicide hotlines: 1-176-553-TALK (0-268.890.7002) and 3-312-NSVUBRC (1-199.432.8836). If you or someone you know talks about suicide or feeling hopeless, get help right away. Constipation and Hemorrhoids · Drink plenty of fluids, enough so that your urine is light yellow or clear like water. If you have kidney, heart, or liver disease and have to limit fluids, talk with your doctor before you increase the amount of fluids you drink. · Eat plenty of fiber each day. Have a bran muffin or bran cereal for breakfast, and try eating a piece of fruit for a mid-afternoon snack. · For painful, itchy hemorrhoids, put ice or a cold pack on the area several times a day for 10 minutes at a time. Follow this by putting a warm compress on the area for another 10 to 20 minutes or by sitting in a shallow, warm bath. When should you call for help? Call 911 anytime you think you may need emergency care. For example, call if: 
· You are thinking of hurting yourself, your baby, or anyone else. · You passed out (lost consciousness). · You have symptoms of a blood clot in your lung (called a pulmonary embolism). These may include:   
· Sudden chest pain. · Trouble breathing. · Coughing up blood. Call your doctor now or seek immediate medical care if: 
· You have severe vaginal bleeding. · You are soaking through a pad each hour for 2 or more hours. · Your vaginal bleeding seems to be getting heavier or is still bright red 4 days after delivery. · You are dizzy or lightheaded, or you feel like you may faint. · You are vomiting or cannot keep fluids down. · You have a fever. · You have new or more belly pain. · You pass tissue (not just blood). · Your vaginal discharge smells bad. · Your belly feels tender or full and hard. · Your breasts are continuously painful or red. · You feel sad, anxious, or hopeless for more than a few days. · You have sudden, severe pain in your belly. · You have symptoms of a blood clot in your leg (called a deep vein thrombosis),  
       such as: 
· Pain in your calf, back of the knee, thigh, or groin. · Redness and swelling in your leg or groin. · You have symptoms of preeclampsia, such as: 
· Sudden swelling of your face, hands, or feet. · New vision problems (such as dimness or blurring). · A severe headache. · Your blood pressure is higher than it should be or rises suddenly. · You have new nausea or vomiting. Watch closely for changes in your health, and be sure to contact your doctor if you have any problems. Additional Information:  Postpartum Support PARENTS:  Are you feeling sad or depressed? Is it difficult for you to enjoy yourself? Do you feel more irritable or tense?  Do you feel anxious or panicky? Are you having difficulty bonding with your baby? Do you feel as if you are \"out of control\" or \"going crazy\"? Are you worried that you might hurt your baby or yourself? FAMILIES: Do you worry that something is wrong but don't know how to help? Do you think that your partner or spouse is having problems coping? Are you worried that it may never get better? While many women experience some mild mood change or \"the blues\" during or after the birth of a child, 1 in 9 women experience more significant symptoms of depression or anxiety. 1 in 10 Dads become depressed during the first year. Things you can do Being a good parent includes taking care of yourself. If you take care of yourself, you will be able to take better care of your baby and your family. · Talk to a counselor or healthcare provider who has training in  mood and anxiety problems. · Learn as much as you can about pregnancy and postpartum depression and anxiety. · Get support from family and friends. Ask for help when you need it. · Join a support group in your area or online. · Keep active by walking, stretching or whatever form of exercise helps you to feel better. · Get enough rest and time for yourself. · Eat a healthy diet. · Don't give up! It may take more than one try to get the right help you need. These are general instructions for a healthy lifestyle: No smoking/ No tobacco products/ Avoid exposure to second hand smoke Surgeon General's Warning:  Quitting smoking now greatly reduces serious risk to your health. Obesity, smoking, and sedentary lifestyle greatly increases your risk for illness A healthy diet, regular physical exercise & weight monitoring are important for maintaining a healthy lifestyle Recognize signs and symptoms of STROKE: 
 
F-face looks uneven A-arms unable to move or move unevenly S-speech slurred or non-existent T-time-call 911 as soon as signs and symptoms begin - DO NOT go  
    back to bed or wait to see if you get better - TIME IS BRAIN. I have had the opportunity to make my options or choices for discharge. I have received and understand these instructions. [employer] Activation Thank you for requesting access to [employer]. Please follow the instructions below to securely access and download your online medical record. [employer] allows you to send messages to your doctor, view your test results, renew your prescriptions, schedule appointments, and more. How Do I Sign Up? 1. In your internet browser, go to [email or web address] 
2. Click on the First Time User? Click Here link in the Sign In box. You will be redirect to the New Member Sign Up page. 3. Enter your [employer] Access Code exactly as it appears below. You will not need to use this code after youve completed the sign-up process. If you do not sign up before the expiration date, you must request a new code. [employer] Access Code: [email or web address] Expires: 10/17/2017  6:30 PM (This is the date your [employer] access code will ) 4. Enter the last four digits of your Social Security Number (xxxx) and Date of Birth (mm/dd/yyyy) as indicated and click Submit. You will be taken to the next sign-up page. 5. Create a [employer] ID. This will be your [employer] login ID and cannot be changed, so think of one that is secure and easy to remember. 6. Create a [employer] password. You can change your password at any time. 7. Enter your Password Reset Question and Answer. This can be used at a later time if you forget your password. 8. Enter your e-mail address. You will receive e-mail notification when new information is available in [address] 9. Click Sign Up. You can now view and download portions of your medical record. 10. Click the Download Summary menu link to download a portable copy of your medical information. Additional Information If you have questions, please visit the Frequently Asked Questions section of the [employer] website at [email or web address] Remember, [employer] is NOT to be used for urgent needs. For medical emergencies, dial 911. Discharge Orders None [employer] Announcement We are excited to announce that we are making your provider's discharge notes available to you in [employer]. You will see these notes when they are completed and signed by the physician that discharged you from your recent hospital stay. If you have any questions or concerns about any information you see in [employer], please call the Health Information Department where you were seen or reach out to your Primary Care Provider for more information about your plan of care. Introducing [facility] SERVICES! [facility] introduces [employer] patient portal. Now you can access parts of your medical record, email your doctor's office, and request medication refills online. 1. In your internet browser, go to [email or web address] 2. Click on the First Time User? Click Here link in the Sign In box. You will see the New Member Sign Up page. 3. Enter your [employer] Access Code exactly as it appears below. You will not need to use this code after youve completed the sign-up process. If you do not sign up before the expiration date, you must request a new code. · [employer] Access Code: [email or web address] Expires: 10/17/2017  6:30 PM 
 
4. Enter the last four digits of your Social Security Number (xxxx) and Date of Birth (mm/dd/yyyy) as indicated and click Submit. You will be taken to the next sign-up page. 5. Create a [employer] ID. This will be your [employer] login ID and cannot be changed, so think of one that is secure and easy to remember. 6. Create a [employer] password. You can change your password at any time. 7. Enter your Password Reset Question and Answer.  This can be used at a later time if you forget your password. 8. Enter your e-mail address. You will receive e-mail notification when new information is available in [address] 9. Click Sign Up. You can now view and download portions of your medical record. 10. Click the Download Summary menu link to download a portable copy of your medical information. If you have questions, please visit the Frequently Asked Questions section of the [employer] website. Remember, [employer] is NOT to be used for urgent needs. For medical emergencies, dial 911. Now available from your iPhone and Android! General Information Please provide this summary of care documentation to your next provider. Patient Signature:  ____________________________________________________________ Date:  ____________________________________________________________  
  
[facility] Provider Signature:  ____________________________________________________________ Date:  ____________________________________________________________

## 2017-09-12 NOTE — PROGRESS NOTES
9/12/2017  6:09 AM Pt arrived to LDR 11 for scheduled induction. 0750 Bedside and Verbal shift change report given to Anibal Sepulveda RN (oncoming nurse) by Daron Blakely RN (offgoing nurse). Report included the following information SBAR, Kardex, MAR, Accordion, Recent Results and Med Rec Status.

## 2017-09-12 NOTE — ANESTHESIA PROCEDURE NOTES
Epidural Block    Start time: 9/12/2017 1:41 PM  End time: 9/12/2017 1:51 PM  Performed by: Darryl Cardoso  Authorized by: Darryl Cardoso     Pre-Procedure  Indication: labor epidural    Preanesthetic Checklist: patient identified, risks and benefits discussed, anesthesia consent, site marked, patient being monitored, timeout performed and anesthesia consent      Epidural:   Patient position:  Left lateral decubitus  Prep region:  Lumbar  Prep: Betadine    Location:  L3-4    Needle and Epidural Catheter:   Needle Type:  Tuohy  Needle Gauge:  17 G  Injection Technique:  Loss of resistance using air  Attempts:  1  Catheter Size:  19 G  Catheter at Skin Depth (cm):  9  Depth in Epidural Space (cm):  4  Events: no blood with aspiration, no cerebrospinal fluid with aspiration, no paresthesia and negative aspiration test    Test Dose:  Lidocaine 1.5% w/ epi and negative    Assessment:   Catheter Secured:  Tegaderm and tape  Insertion:  Uncomplicated  Patient tolerance:  Patient tolerated the procedure well with no immediate complications

## 2017-09-12 NOTE — H&P
EDC:2017  EGA: 40 weeks, 3 days      History of Present Illness:  , spontaneous vaginal delivery x4 presents for elective induction due to severe back pain. bpnc with Dr. Marck Lima  rh+, group b streptococcus +      Patient's Prenatal Care with Doctor of Luis Eduardo Chacon MD Notable For -    Discomforts of pregnancy  Normal pregnancy multigravida  GBS positive, PCN in labor          Impression & Recommendations:    Problem # 1:  Normal pregnancy multigravida (ICD-V22.1) (TMV46-A51.83)  elective pitocin induction at term  desires epidural    Problem # 2:  GBS positive, PCN in labor (PNP-536.22) (HIL84-C73.82)  amp          Past Medical History:     Reviewed history from 2017 and no changes required:        Seasonal allergies     Past Surgical History:     Reviewed history from 2017 and no changes required:        Right breast surgery- cyst removed      Family History Summary:      Reviewed history Last on 2017 and no changes required:2017      General Comments - FH:  Family history transferred to [address] [place] [address]     Social History:     Reviewed history from 2017 and no changes required:                Working at [employer] [place]        See HPI    Except as noted in the HPI, the review of systems is negative for General, Breast, , Resp, GI, Endo, MS, Psych and Heme.     Allergies      Medications Removed from Medication List        Flowsheet View for Follow-up Visit     Estimated weeks of        gestation:  36 3/7     Weight:  195     Fetal position:  vertex     Cx Dilation:  1cm     Cx Effacement: 50%     Cx Station:  -3      Height:   61.5 inches  Weight:  195 pounds      Physical Exam     General           General appearance:  no acute distress    Psych           Mood:  no appearance of anxiety, depression, or agitation    Abdomen           Abdomen:  gravid    Pelvic Exam           EGBUS:  no lesions                  Dilation: : 1cm Effacement:  50%                  Station:  -3                  Presentation:  vertex                  Membranes:  intact                  Pelvis:  tested            Impression & Recommendations:    Problem # 1:  Normal pregnancy multigravida (ICD-V22.1) (JGI21-D30.12)  elective pitocin induction at term  desires epidural    Problem # 2:  GBS positive, PCN in labor (GMR-523.73) (ORE81-T61.82)  amp      Medications (at conclusion of this visit)    09/05/2017 AMBIEN 5 MG ORAL TABS (ZOLPIDEM TARTRATE) 1 tablet PO qHS as needed for insomnia  07/05/2017 BREAST PUMP MISC (MISC. DEVICES) use as directed to maintain milk supply postpartum; lactating  03/07/2017 NASACORT ALLERGY 24HR AERO (TRIAMCINOLONE ACETONIDE AERO)           LABORATORY DATA   TEST DATE RESULT   Group B Strep culture 08/15/2017 Positive                                   (Group B Strep Culture Result Field)   Blood Type 03/07/2017 A                                             (Blood Type Result Field)   Rh 03/07/2017 Positive                                   (Rh Result Field)   Rhogam Inj Given     Tdap Vaccine Given 06/20/2017 Vacc.  [address]   Antibody Screen 06/20/2017 Negative   Rubella  [facility] Reference Ranges On or After 3/10/14                  <0.90              Non-immune      0.90 - 0.99     Equivocal      >0.99              Immune    [facility] Reference Ranges  Before 3/10/14           <5                 Non-immune             5 - 9               Equivocal            >9                 Immune  [facility] Reference Ranges       < Or = 0.90       Negative             0.91-1.09          Equivocal            > Or = 1.10       Positive   03/07/2017     1.91     TPA (T Pallidum Antibodies) 06/20/2017 Negative   Serology (RPR)     HBsAg 03/07/2017 Negative   HIV 03/07/2017 Non Reactive   Hemoglobin 06/20/2017 11.7   Hematocrit 06/20/2017 35.9   Platelets 84/91/2672 260 X10E3/UL   TSH     Urine Culture 03/07/2017 Negative   GC DNA Probe 03/07/2017 Negative   Chlamydia DNA 03/07/2017 Negative   PAP 03/07/2017 NIL   Flu Vaccine Given 03/07/2017 Vacc. VWC   HGBA1C     HGB Electro 03/07/2017 N/A   T4, Free     BG Fasting     GTT 1H 50G 06/20/2017 93   GTT 1H 100G     GTT 2H 100G     GTT 3H 100G     Glucose Plasma     CF Accept or Decline 03/07/2017 declined   CF Screen Result     Nuchal Trans 05/02/2017 3.43^3. 43 mm&millimeters   AFP Only 04/12/2017 *Screen Negative*   Tetra     AFP Serum     CVS 04/04/2017 declined   AFP Amniotic     Amnio Karyo 04/04/2017 declined   FISH     GC Culture     Chlamydia Cult     Ureaplasma     Mycoplasma     WBC 03/07/2017 9.1 X10E3/UL   RBC 03/07/2017 4.67 X10E6/UL   MCV 03/07/2017 86   MCH 03/07/2017 28.7   MCHC RBC 03/07/2017 33.4     ULTRASOUND DATA   TEST DATE RESULT   Estimated Fetal Weight 05/02/2017 743.63693656^489 g&grams                                     Weight % 05/02/2017 59^59% %&percent                                                BRYAN                      BPP     Cervical Length (mm)             Electronically signed by Tracey Russ MD on 09/12/2017 at 8:27 AM    ________________________________________________________________________

## 2017-09-12 NOTE — IP AVS SNAPSHOT
[address] 
[phone] Patient: Reema Alvarez MRN: [id number] ZOT:[id number]/07/[id number] Current Discharge Medication List  
  
START taking these medications Dose & Instructions Dispensing Information Comments Morning Noon Evening Bedtime  
 ibuprofen 600 mg tablet Commonly known as:  MOTRIN Your last dose was: Your next dose is:    
   
   
 Dose:  600 mg Take 1 Tab by mouth every six (6) hours as needed for Pain. Quantity:  30 Tab Refills:  1  
     
   
   
   
  
 oxyCODONE-acetaminophen 5-325 mg per tablet Commonly known as:  PERCOCET Your last dose was: Your next dose is:    
   
   
 Dose:  1 Tab Take 1 Tab by mouth every six (6) hours as needed. Max Daily Amount: 4 Tabs. Quantity:  30 Tab Refills:  0 CONTINUE these medications which have NOT CHANGED Dose & Instructions Dispensing Information Comments Morning Noon Evening Bedtime PNV#16-Iron Fum & PS-FA-OM-3 35-1-200 mg Cap Your last dose was: Your next dose is: Take  by mouth. Refills:  0  
     
   
   
   
  
 triamcinolone 55 mcg nasal inhaler Commonly known as:  NASACORT AQ Your last dose was: Your next dose is:    
   
   
 Dose:  2 Spray 2 Sprays daily. Refills:  0 Where to Get Your Medications Information on where to get these meds will be given to you by the nurse or doctor. ! Ask your nurse or doctor about these medications  
  ibuprofen 600 mg tablet  
 oxyCODONE-acetaminophen 5-325 mg per tablet

## 2017-09-12 NOTE — PROGRESS NOTES
09/12/17 1357   Maternal Vital Signs   Temp 97.9 °F (36.6 °C)   Temp Source Oral   Pulse (Heart Rate) (!) 117   Resp Rate 18   Level of Consciousness Alert   /51   MEWS Score 3   pt voices no complaints. Just received epidural.  Will continue to monitor.

## 2017-09-12 NOTE — PROGRESS NOTES
0750 Bedside and Verbal shift change report given to ELIDIA Li by [place]. Diane Quevedo, RN. Report included the following information SBAR, Intake/Output and Fenton Babinski Dr. Derwin Gong at the bedside. Reviewing FHT and discussing plan of care. SVE 1cm. 0900 Pitocin started. 1250 Bedside and Verbal shift change report given to ELIDIA Harris RN by Yudy Melendez. Belle Morgan RN. Report included the following information SBAR, Intake/Output and MAR.

## 2017-09-12 NOTE — PROGRESS NOTES
Bedside and Verbal shift change report given to LIGIA Sandoval RN (oncoming nurse) by Joseph Jha RN (offgoing nurse). Report included the following information SBAR, Intake/Output, MAR and Recent Results.  SVE: 10/100/+2. Strong urge to push.  Dr Cely Hua at bedside. Legs up in [facility].  Starting to push.   of a LMI over an intact perineum. Baby crying, placed on mom's chest.   Partial bath and pericare done. Epid cath removed, site without redness or swelling.  Verbal shift change report given to Malena Lyons RN (oncoming nurse) by Valdemar Brandon RN (offgoing nurse). Report included the following information SBAR, Intake/Output, MAR and Recent Results.

## 2017-09-12 NOTE — PROGRESS NOTES
09/12/17 1757   Maternal Vital Signs   Temp 98.2 °F (36.8 °C)   Temp Source Oral   Pulse (Heart Rate) (!) 114   Resp Rate 18   Level of Consciousness Alert   /61   MEWS Score 3   Pt voices no complaints. Will continue to monitor closely.

## 2017-09-13 PROCEDURE — 74011250637 HC RX REV CODE- 250/637: Performed by: SPECIALIST

## 2017-09-13 PROCEDURE — 65410000002 HC RM PRIVATE OB

## 2017-09-13 RX ORDER — OXYCODONE AND ACETAMINOPHEN 5; 325 MG/1; MG/1
1 TABLET ORAL
Qty: 30 TAB | Refills: 0 | Status: SHIPPED | OUTPATIENT
Start: 2017-09-13 | End: 2020-02-13

## 2017-09-13 RX ORDER — IBUPROFEN 600 MG/1
600 TABLET ORAL
Qty: 30 TAB | Refills: 1 | Status: SHIPPED | OUTPATIENT
Start: 2017-09-13 | End: 2020-02-13

## 2017-09-13 RX ADMIN — IBUPROFEN 800 MG: 400 TABLET, FILM COATED ORAL at 13:12

## 2017-09-13 RX ADMIN — IBUPROFEN 800 MG: 400 TABLET, FILM COATED ORAL at 22:41

## 2017-09-13 NOTE — LACTATION NOTE
This note was copied from a baby's chart. Infant born vaginally yesterday evening to a  mom who  & bottlefed  her previous 4 children, stating she did not have enough milk. Infant born at 36 3/7 weeks gestation. Mom has short nipples, that are angular at the top. Infant has a tight jaw and does not open mouth wide. Discussed with mom the process of lactogenesis, in regards to frequent stimulation and colostrum removal from the breasts. Encouraged mom to put infant to the breat 8-12 times in 24 hours. Demonstrated manual expression. Infant circumcised this morning and unable to get him latched at this time. Instructed mom to call for assist when infant displays feeding cues.

## 2017-09-13 NOTE — ROUTINE PROCESS
Bedside shift change report given to NIKO Blair RN (oncoming nurse) by Amado Mckeon. Carey Perez RN (offgoing nurse). Report included the following information SBAR, Kardex, MAR, Accordion and Recent Results.

## 2017-09-13 NOTE — PROGRESS NOTES
Post-Partum Day Number 1 Progress Note    Mirian Jerry     Assessment: Doing well, post partum day 1    Plan:  1. Continue routine postpartum and perineal care as well as maternal education. 2. The risks and benefits of the circumcision  procedure and anesthesia including: bleeding, infection, variability of cosmetic results were discussed at length with the mother. She is aware that future repeat procedures may be necessary. She gives informed consent to proceed as noted and her questions are answered. 3. Anticipate discharge home in AM.    Information for the patient's :  Sammy Bosch [[id number]]   Vaginal, Spontaneous Delivery   Patient doing well without significant complaint. Voiding without difficulty, normal lochia. Vitals:  Visit Vitals    /68 (BP 1 Location: Right arm, BP Patient Position: At rest;Sitting)    Pulse 87    Temp 98.1 °F (36.7 °C)    Resp 16    Ht 5' 2\" (1.575 m)    Wt 89.4 kg (197 lb)    SpO2 98%    Breastfeeding Unknown    BMI 36.03 kg/m2     Temp (24hrs), Av.1 °F (36.7 °C), Min:97.4 °F (36.3 °C), Max:98.6 °F (37 °C)        Exam:   Patient without distress. Abdomen soft, fundus firm, nontender                Perineum with normal lochia noted. Lower extremities are negative for swelling, cords or tenderness.     Labs:     Lab Results   Component Value Date/Time    WBC 9.2 2017 07:03 AM    WBC 10.9 10/20/2016 03:06 PM    WBC 10.4 2014 08:55 AM    HGB 11.9 2017 07:03 AM    HGB 13.4 10/20/2016 03:06 PM    HGB 14.1 2014 08:55 AM    HCT 36.5 2017 07:03 AM    HCT 42.3 10/20/2016 03:06 PM    HCT 45.5 2014 08:55 AM    PLATELET 182 5493 07:03 AM    PLATELET 703  03:06 PM    PLATELET 478 5420 08:55 AM    Hgb, External 11.7 2017    Hct, External 35.9 2017    Platelet cnt., External 260 2017       No results found for this or any previous visit (from the past 24 hour(s)).

## 2017-09-13 NOTE — DISCHARGE SUMMARY
Obstetrical Discharge Summary     Name: Lizett Puentes MRN: [id number]  SSN: [id number]    YOB: 1985  Age: 27 y.o. Sex: female      Admit Date: 2017    Discharge Date: 2017     Admitting Physician: Sohail Sy MD     Attending Physician:  Sohail Sy MD     Admission Diagnoses: Labor and Delivery  Pregnancy    Discharge Diagnoses:   Information for the patient's :  Prince Bojorquez [[id number]]   Delivery of a 3.35 kg male infant via Vaginal, Spontaneous Delivery on 2017 at 8:34 PM  by . Apgars were 9 and 9. Additional Diagnoses:   Hospital Problems  Date Reviewed: 2016          Codes Class Noted POA    Pregnancy ICD-10-CM: Z33.1  ICD-9-CM: V22.2  2017 Unknown             Lab Results   Component Value Date/Time    Rubella, External Immune 2017    GrBStrep, External Positive 08/15/2017       Hospital Course: Normal hospital course following the delivery. Patient Instructions:   Current Discharge Medication List      START taking these medications    Details   ibuprofen (MOTRIN) 600 mg tablet Take 1 Tab by mouth every six (6) hours as needed for Pain. Qty: 30 Tab, Refills: 1      oxyCODONE-acetaminophen (PERCOCET) 5-325 mg per tablet Take 1 Tab by mouth every six (6) hours as needed. Max Daily Amount: 4 Tabs. Qty: 30 Tab, Refills: 0         CONTINUE these medications which have NOT CHANGED    Details   triamcinolone (NASACORT AQ) 55 mcg nasal inhaler 2 Sprays daily. PNV#16-Iron Fum & PS-FA-OM-3 35-1-200 mg cap Take  by mouth. Disposition at Discharge: Home or self care    Condition at Discharge: Stable    Reference my discharge instructions.     Follow-up Appointments   Procedures    FOLLOW UP VISIT Appointment in: 6 Weeks     Standing Status:   Standing     Number of Occurrences:   1     Order Specific Question:   Appointment in     Answer:   6 Weeks        Signed By:  Antonieta Argueta MD     2017

## 2017-09-13 NOTE — L&D DELIVERY NOTE
Delivery Summary  Patient: Kishan Cullen             Circumcision:   desires  Additional Delivery Comments -  of vigorous male infant over intact perineum      Information for the patient's :  Lina Lacy [[id number]]       Labor Events:    Labor: No   Rupture Date: 2017   Rupture Time: 2:20 PM   Rupture Type AROM   Amniotic Fluid Volume:      Amniotic Fluid Description: Clear     Induction: Oxytocin       Augmentation: None   Labor Events:       Cervical Ripening:     None     Delivery Events:  Episiotomy: None   Laceration(s): None     Repaired: None    Number of Repair Packets:     Suture Type and Size: None     Estimated Blood Loss (ml): 200ml       Delivery Date: 2017    Delivery Time: 8:34 PM  Delivery Type:    Sex:  Male     Gestational Age: 44w3d   Delivery Clinician:  Erick Bullard  Living Status: Living   Delivery Location: L&D LDR 11          APGARS  One minute Five minutes Ten minutes   Skin color: 1   1        Heart rate: 2   2        Grimace: 2   2        Muscle tone: 2   2        Breathin   2        Totals: 9   9            Presentation: Vertex    Position:        Resuscitation Method:  Tactile Stimulation;Suctioning-bulb     Meconium Stained: None      Cord Vessels: 3 Vessels      Cord Events:    Cord Blood Sent?:  No    Blood Gases Sent?:  No    Placenta:  Date/Time:   8:38 PM  Removal: Spontaneous      Appearance: Normal;Intact      Measurements:  Birth Weight:        Birth Length:        Head Circumference:        Chest Circumference:       Abdominal Girth:       Other Providers:   Meeta Duque, Obstetrician;Primary Nurse;Primary  Nurse           Cord pH:  none    Episiotomy: None   Laceration(s): None     Estimated Blood Loss (ml): 200    Labor Events  Method: Oxytocin      Augmentation: None   Cervical Ripening:       None        Operative Vaginal Delivery - none    Group B Strep:   Lab Results Component Value Date/Time    GrBStrep, External Positive 08/15/2017     Information for the patient's :  Nayeli Montes [[id number]]   No results found for: ABORH, PCTABR, PCTDIG, BILI, ABORHEXT, ABORH    No results found for: APH, APCO2, APO2, AHCO3, ABEC, ABDC, O2ST, EPHV, PCO2V, PO2V, HCO3V, EBEV, EBDV, SITE, RSCOM

## 2017-09-13 NOTE — ROUTINE PROCESS
2315- TRANSFER - IN REPORT:    Verbal report received from Caleb Cooney RN(name) on Candace Sutherland  being received from L&D(unit) for routine progression of care      Report consisted of patients Situation, Background, Assessment and   Recommendations(SBAR). Information from the following report(s) SBAR was reviewed with the receiving nurse. Opportunity for questions and clarification was provided. Assessment completed upon patients arrival to unit and care assumed.

## 2017-09-13 NOTE — ROUTINE PROCESS
Bedside and Verbal shift change report given to Chikis Salazar RN (oncoming nurse) by NIKO Mathur RN (offgoing nurse). Report included the following information SBAR, Kardex, Procedure Summary, Intake/Output, MAR and Recent Results.

## 2017-09-14 VITALS
RESPIRATION RATE: 16 BRPM | WEIGHT: 197 LBS | TEMPERATURE: 97.5 F | HEIGHT: 62 IN | BODY MASS INDEX: 36.25 KG/M2 | DIASTOLIC BLOOD PRESSURE: 67 MMHG | SYSTOLIC BLOOD PRESSURE: 113 MMHG | OXYGEN SATURATION: 98 % | HEART RATE: 89 BPM

## 2017-09-14 PROCEDURE — 74011250637 HC RX REV CODE- 250/637: Performed by: SPECIALIST

## 2017-09-14 RX ADMIN — IBUPROFEN 800 MG: 400 TABLET, FILM COATED ORAL at 08:11

## 2017-09-14 NOTE — DISCHARGE INSTRUCTIONS
POSTPARTUM DISCHARGE INSTRUCTIONS       Name:  Candace Sutherland  YOB: 1985  Admission Diagnosis:  Labor and Delivery  Pregnancy     Discharge Diagnosis:    Problem List as of 9/13/2017  Date Reviewed: 2/29/2016          Codes Class Noted - Resolved    Pregnancy ICD-10-CM: Z33.1  ICD-9-CM: V22.2  9/12/2017 - Present        Abdominal pain during pregnancy in third trimester ICD-10-CM: O26.893, R10.9  ICD-9-CM: 646.83, 789.00  8/10/2017 - Present        Back pain affecting pregnancy ICD-10-CM: O26.899, M54.9  ICD-9-CM: 646.80, 724.5  7/19/2017 - Present        Mastodynia ICD-10-CM: N64.4  ICD-9-CM: 611.71  2/29/2016 - Present        Fibroadenoma of right breast ICD-10-CM: D24.1  ICD-9-CM: 995  1/19/2016 - Present            Attending Physician:  Minnie Bowers MD    Delivery Type:  Vaginal Childbirth: What To Expect At Home    Your Recovery: Your body will slowly heal in the next few weeks. It is easy to get too tired and overwhelmed during the first weeks after your baby is born. Changes in your hormones can shift your mood without warning. You may find it hard to meet the extra demands on your energy and time. Take it easy on yourself. Follow-up care is a key part of your treatment and safety. Be sure to make and go to all appointments, and call your doctor if you are having problems. It's also a good idea to know your test results and keep a list of the medicines you take. How can you care for yourself at home? Vaginal bleeding and cramps  · After delivery, you will have a bloody discharge from the vagina. This will turn pink within a week and then white or yellow after about 10 days. It may last for 2 to 4 weeks or longer, until the uterus has healed. Use pads instead of tampons until you stop bleeding. · Do not worry if you pass some blood clots, as long as they are smaller than a golf ball.  If you have a tear or stitches in your vaginal area, change the pad at least every 4 hours to prevent soreness and infection. · You may have cramps for the first few days after childbirth. These are normal and occur as the uterus shrinks to normal size. Take an over-the-counter pain medicine, such as acetaminophen (Tylenol), ibuprofen (Advil, Motrin), or naproxen (Aleve), for cramps. Read and follow all instructions on the label. Do not take aspirin, because it can cause more bleeding. Do not take acetaminophen (Tylenol) and other acetaminophen containing medications (i.e. Percocet) at the same time. Breast fullness  · Your breasts may overfill (engorge) in the first few days after delivery. To help milk flow and to relieve pain, warm your breasts in the shower or by using warm, moist towels before nursing. · If you are not nursing, do not put warmth on your breasts or touch your breasts. Wear a tight bra or sports bra and use ice until the fullness goes away. This usually takes 2 to 3 days. · Put ice or a cold pack on your breast after nursing to reduce swelling and pain. Put a thin cloth between the ice and your skin. Activity  · Eat a balanced diet. Do not try to lose weight by cutting calories. Keep taking your prenatal vitamins, or take a multivitamin. · Get as much rest as you can. Try to take naps when your baby sleeps during the day. · Get some exercise every day. But do not do any heavy exercise until your doctor says it is okay. · Wait until you are healed (about 4 to 6 weeks) before you have sexual intercourse. Your doctor will tell you when it is okay to have sex. · Talk to your doctor about birth control. You can get pregnant even before your period returns. Also, you can get pregnant while you are breast-feeding. Mental Health  · Many women get the \"baby blues\" during the first few days after childbirth. You may lose sleep, feel irritable, and cry easily. You may feel happy one minute and sad the next. Hormone changes are one cause of these emotional changes.  Also, the demands of a new baby, along with visits from relatives or other family needs, add to a mother's stress. The \"baby blues\" often peak around the fourth day. Then they ease up in less than 2 weeks. · If your moodiness or anxiety lasts for more than 2 weeks, or if you feel like life is not worth living, you may have postpartum depression. This is different for each mother. Some mothers with serious depression may worry intensely about their infant's well-being. Others may feel distant from their child. Some mothers might even feel that they might harm their baby. A mother may have signs of paranoia, wondering if someone is watching her. · With all the changes in your life, you may not know if you are depressed. Pregnancy sometimes causes changes in how you feel that are similar to the symptoms of depression. · Symptoms of depression include:  · Feeling sad or hopeless and losing interest in daily activities. These are the most common symptoms of depression. · Sleeping too much or not enough. · Feeling tired. You may feel as if you have no energy. · Eating too much or too little. · POSTPARTUM SUPPORT INTERNATIONAL (PSI) offers a Warm line; Chat with the Expert phone sessions; Information and Articles about Pregnancy and Postpartum Mood Disorders; Comprehensive List of Free Support Groups; Knowledgeable local coordinators who will offer support, information, and resources; Guide to Resources on [employer]; Calendar of events in the  mood disorders community; Latest News and Research; and [facility] [address] for [employer]. Remember - You are not alone; You are not to blame; With help, you will be well. 8-840-559-PPD(6410). WWW. POSTPARTUM. NET   · Writing or talking about death, such as writing suicide notes or talking about guns, knives, or pills. Keep the numbers for these national suicide hotlines: 4-263-823-TALK (7-629.120.1196) and 4-554-SOBCWGF (9-803.904.5451).  If you or someone you know talks about suicide or feeling hopeless, get help right away. Constipation and Hemorrhoids  · Drink plenty of fluids, enough so that your urine is light yellow or clear like water. If you have kidney, heart, or liver disease and have to limit fluids, talk with your doctor before you increase the amount of fluids you drink. · Eat plenty of fiber each day. Have a bran muffin or bran cereal for breakfast, and try eating a piece of fruit for a mid-afternoon snack. · For painful, itchy hemorrhoids, put ice or a cold pack on the area several times a day for 10 minutes at a time. Follow this by putting a warm compress on the area for another 10 to 20 minutes or by sitting in a shallow, warm bath. When should you call for help? Call 911 anytime you think you may need emergency care. For example, call if:  · You are thinking of hurting yourself, your baby, or anyone else. · You passed out (lost consciousness). · You have symptoms of a blood clot in your lung (called a pulmonary embolism). These may include:    · Sudden chest pain. · Trouble breathing. · Coughing up blood. Call your doctor now or seek immediate medical care if:  · You have severe vaginal bleeding. · You are soaking through a pad each hour for 2 or more hours. · Your vaginal bleeding seems to be getting heavier or is still bright red 4 days after delivery. · You are dizzy or lightheaded, or you feel like you may faint. · You are vomiting or cannot keep fluids down. · You have a fever. · You have new or more belly pain. · You pass tissue (not just blood). · Your vaginal discharge smells bad. · Your belly feels tender or full and hard. · Your breasts are continuously painful or red. · You feel sad, anxious, or hopeless for more than a few days. · You have sudden, severe pain in your belly.   · You have symptoms of a blood clot in your leg (called a deep vein thrombosis),          such as:  · Pain in your calf, back of the knee, thigh, or groin. · Redness and swelling in your leg or groin. · You have symptoms of preeclampsia, such as:  · Sudden swelling of your face, hands, or feet. · New vision problems (such as dimness or blurring). · A severe headache. · Your blood pressure is higher than it should be or rises suddenly. · You have new nausea or vomiting. Watch closely for changes in your health, and be sure to contact your doctor if you have any problems. Additional Information:  Postpartum Support    PARENTS:  Are you feeling sad or depressed? Is it difficult for you to enjoy yourself? Do you feel more irritable or tense? Do you feel anxious or panicky? Are you having difficulty bonding with your baby? Do you feel as if you are \"out of control\" or \"going crazy\"? Are you worried that you might hurt your baby or yourself? FAMILIES: Do you worry that something is wrong but don't know how to help? Do you think that your partner or spouse is having problems coping? Are you worried that it may never get better? While many women experience some mild mood change or \"the blues\" during or after the birth of a child, 1 in 9 women experience more significant symptoms of depression or anxiety. 1 in 10 Dads become depressed during the first year. Things you can do  Being a good parent includes taking care of yourself. If you take care of yourself, you will be able to take better care of your baby and your family. · Talk to a counselor or healthcare provider who has training in  mood and anxiety problems. · Learn as much as you can about pregnancy and postpartum depression and anxiety. · Get support from family and friends. Ask for help when you need it. · Join a support group in your area or online. · Keep active by walking, stretching or whatever form of exercise helps you to feel better. · Get enough rest and time for yourself. · Eat a healthy diet. · Don't give up!  It may take more than one try to get the right help you need. These are general instructions for a healthy lifestyle:    No smoking/ No tobacco products/ Avoid exposure to second hand smoke    Surgeon General's Warning:  Quitting smoking now greatly reduces serious risk to your health. Obesity, smoking, and sedentary lifestyle greatly increases your risk for illness    A healthy diet, regular physical exercise & weight monitoring are important for maintaining a healthy lifestyle    Recognize signs and symptoms of STROKE:    F-face looks uneven    A-arms unable to move or move unevenly    S-speech slurred or non-existent    T-time-call 911 as soon as signs and symptoms begin - DO NOT go       back to bed or wait to see if you get better - TIME IS BRAIN. I have had the opportunity to make my options or choices for discharge. I have received and understand these instructions. Te[employer]n Go Activation    Thank you for requesting access to [employer] Go. Please follow the instructions below to securely access and download your online medical record. [employer] Go allows you to send messages to your doctor, view your test results, renew your prescriptions, schedule appointments, and more. How Do I Sign Up? 1. In your internet browser, go to [email or web address]  2. Click on the First Time User? Click Here link in the Sign In box. You will be redirect to the New Member Sign Up page. 3. Enter your Te[employer]n Go Access Code exactly as it appears below. You will not need to use this code after youve completed the sign-up process. If you do not sign up before the expiration date, you must request a new code. Teach 'n Go Access Code: [email or web address]  Expires: 10/17/2017  6:30 PM (This is the date your Teach [employer]n Go access code will )    4. Enter the last four digits of your Social Security Number (xxxx) and Date of Birth (mm/dd/yyyy) as indicated and click Submit. You will be taken to the next sign-up page. 5. Create a Teach 'n Go ID.  This will be your Te[employer]n Go login ID and cannot be changed, so think of one that is secure and easy to remember. 6. Create a Devex password. You can change your password at any time. 7. Enter your Password Reset Question and Answer. This can be used at a later time if you forget your password. 8. Enter your e-mail address. You will receive e-mail notification when new information is available in [address] 9. Click Sign Up. You can now view and download portions of your medical record. 10. Click the Download Summary menu link to download a portable copy of your medical information. Additional Information    If you have questions, please visit the Frequently Asked Questions section of the [employer] website at [email or web address] Remember, De[employer] is NOT to be used for urgent needs. For medical emergencies, dial 911.

## 2017-09-14 NOTE — DISCHARGE SUMMARY
Obstetrical Discharge Summary     Name: Zayra Green MRN: [id number]  SSN: [id number]    YOB: 1985  Age: 27 y.o. Sex: female      Admit Date: 2017    Discharge Date: 2017     Admitting Physician: Sherron Gregg MD     Attending Physician:  Sherron Gregg MD     Admission Diagnoses: Labor and Delivery  Pregnancy    Procedure Performed:  * No surgery found *  Surgical     Used for misc procedures    Discharge Diagnoses:   Information for the patient's :  Shannon Young [[id number]]   Delivery of a 3.35 kg male infant via Vaginal, Spontaneous Delivery on 2017 at 8:34 PM  by . Apgars were 9 and 9. Additional Diagnoses:   Hospital Problems  Date Reviewed: 2016          Codes Class Noted POA    Pregnancy ICD-10-CM: Z33.1  ICD-9-CM: V22.2  2017 Unknown             Lab Results   Component Value Date/Time    Rubella, External Immune 2017    GrBStrep, External Positive 08/15/2017       Hospital Course: Normal hospital course following the delivery. Patient Disposition: Home      Followup Care:  Discharge Condition: good  Activity as tolerated and No sex for 6 weeks  Regular Diet  None needed    Patient Instructions:   Current Discharge Medication List      START taking these medications    Details   ibuprofen (MOTRIN) 600 mg tablet Take 1 Tab by mouth every six (6) hours as needed for Pain. Qty: 30 Tab, Refills: 1      oxyCODONE-acetaminophen (PERCOCET) 5-325 mg per tablet Take 1 Tab by mouth every six (6) hours as needed. Max Daily Amount: 4 Tabs. Qty: 30 Tab, Refills: 0         CONTINUE these medications which have NOT CHANGED    Details   triamcinolone (NASACORT AQ) 55 mcg nasal inhaler 2 Sprays daily. PNV#16-Iron Fum & PS-FA-OM-3 35-1-200 mg cap Take  by mouth. Reference my discharge instructions.     Follow-up Appointments   Procedures    FOLLOW UP VISIT Appointment in: 6 Weeks     Standing Status:   Standing     Number of Occurrences:   1     Order Specific Question:   Appointment in     Answer:   6 Weeks        Signed By:  Lisa Mckenna MD     September 14, 2017

## 2017-09-14 NOTE — PROGRESS NOTES
Chart reviewed. Consult received for resources regarding insurance coverage. CM met with pt/family at the bedside this AM to introduce role of care management, offer support, and discuss disposition needs. Pt has Medicaid coverage and requested to add infant to Medicaid policy. CM utilized LetMeHearYa service to communicate with pt at the bedside. Advised pt to contact her insurance company to add infant to own policy. CM assisted pt in calling insurance company. Infant has been added and insurance card will be mailed to parents within 7-10 business days. CM also instructed parents to contact  to notify of delivery. CM assisted parents in scheduling a pediatrician appointment. Parents preferred Dr. Zully Chi, however Dr. Zully Chi is currently not accepting new patients. CM advised that [address] takes walk-in appointments for newborns. Instructed parents to arrive at 8:15AM on 9/15 for new patient appointment. CM provided opportunity for questions/concerns. None voiced at this time. Parents have car seat and transportation home. No barriers to discharge identified/no home needs. Care Management Interventions  PCP Verified by CM: Yes  Mode of Transport at Discharge:  Other (see comment) (family by car)  Transition of Care Consult (CM Consult): Discharge Planning  MyChart Signup: No  Discharge Durable Medical Equipment: No  Health Maintenance Reviewed: Yes  Physical Therapy Consult: No  Occupational Therapy Consult: No  Speech Therapy Consult: No  Current Support Network: Lives with Spouse, Own Home  Confirm Follow Up Transport: Family  Plan discussed with Pt/Family/Caregiver: Yes  Freedom of Choice Offered: Yes  Discharge Location  Discharge Placement: Home    [employer], MSW

## 2017-09-14 NOTE — PROGRESS NOTES
Post-Partum Day Number 2 Progress Note    Mirian Jerry     Assessment: Doing well, post partum day 2    Plan:   1. Discharge home today  2. Follow up in office in 6 weeks with Saul Alvarez MD  3. Post partum activity advised, diet as tolerated  4. Discharge Medications: ibuprofen, percocet and medications prior to admission    Information for the patient's :  Sammy Bosch [[id number]]   Vaginal, Spontaneous Delivery   Patient doing well without significant complaint. Voiding without difficulty, normal lochia. Vitals:  Visit Vitals    /66 (BP 1 Location: Right arm, BP Patient Position: At rest)    Pulse 82    Temp 97.4 °F (36.3 °C)    Resp 18    Ht 5' 2\" (1.575 m)    Wt 89.4 kg (197 lb)    SpO2 98%    Breastfeeding Unknown    BMI 36.03 kg/m2     Temp (24hrs), Av.7 °F (36.5 °C), Min:97.4 °F (36.3 °C), Max:97.9 °F (36.6 °C)      Exam:         Patient without distress. Abdomen soft, fundus firm, nontender                 Lower extremities are negative for swelling, cords or tenderness. Labs:     Lab Results   Component Value Date/Time    WBC 9.2 2017 07:03 AM    WBC 10.9 10/20/2016 03:06 PM    WBC 10.4 2014 08:55 AM    HGB 11.9 2017 07:03 AM    HGB 13.4 10/20/2016 03:06 PM    HGB 14.1 2014 08:55 AM    HCT 36.5 2017 07:03 AM    HCT 42.3 10/20/2016 03:06 PM    HCT 45.5 2014 08:55 AM    PLATELET 195  07:03 AM    PLATELET 165  03:06 PM    PLATELET 487  08:55 AM    Hgb, External 11.7 2017    Hct, External 35.9 2017    Platelet cnt., External 260 2017       No results found for this or any previous visit (from the past 24 hour(s)).

## 2017-09-14 NOTE — ROUTINE PROCESS
Bedside and Verbal shift change report given to RIA Lai RN (oncoming nurse) by JUAN DIEGO Wilson RN (offgoing nurse). Report included the following information SBAR, Kardex, MAR and Accordion.

## 2017-09-14 NOTE — PROGRESS NOTES
Bedside and Verbal shift change report given to George Daily (oncoming nurse) by derik Monteiro RN (offgoing nurse). Report included the following information SBAR. Mom would like to get set up with medicaid. Nurse LM with care management. Awaiting call back. [phone]: Nurse spoke to pt via blue phone, interpretor number: [id number]      1330:Nurse reviewed discharge instructions with pt via blue phone, interpretor number: [id number]. Pt verbalized understanding.

## 2018-03-30 ENCOUNTER — HOSPITAL ENCOUNTER (OUTPATIENT)
Dept: MAMMOGRAPHY | Age: 33
Discharge: HOME | End: 2018-03-30
Attending: INTERNAL MEDICINE
Payer: SUBSIDIZED

## 2018-03-30 DIAGNOSIS — N63.10 BREAST MASS, RIGHT: ICD-10-CM

## 2018-03-30 DIAGNOSIS — R92.8 MAMMOGRAM ABNORMAL: ICD-10-CM

## 2018-03-30 PROCEDURE — 77066 DX MAMMO INCL CAD BI: CPT

## 2018-03-30 PROCEDURE — 76642 ULTRASOUND BREAST LIMITED: CPT

## 2018-04-25 ENCOUNTER — HOSPITAL ENCOUNTER (OUTPATIENT)
Dept: CT IMAGING | Age: 33
Discharge: HOME | End: 2018-04-25
Attending: INTERNAL MEDICINE
Payer: SUBSIDIZED

## 2018-04-25 DIAGNOSIS — J33.0: ICD-10-CM

## 2018-04-25 DIAGNOSIS — J32.4 CHRONIC PANSINUSITIS: ICD-10-CM

## 2018-04-25 PROCEDURE — 70486 CT MAXILLOFACIAL W/O DYE: CPT

## 2018-12-17 ENCOUNTER — HOSPITAL ENCOUNTER (OUTPATIENT)
Dept: CT IMAGING | Age: 33
Discharge: HOME | End: 2018-12-17
Attending: INTERNAL MEDICINE
Payer: SUBSIDIZED

## 2018-12-17 DIAGNOSIS — J32.9 CHRONIC SINUSITIS: ICD-10-CM

## 2018-12-17 PROCEDURE — 70486 CT MAXILLOFACIAL W/O DYE: CPT

## 2019-07-25 ENCOUNTER — HOSPITAL ENCOUNTER (OUTPATIENT)
Dept: CT IMAGING | Age: 34
Discharge: HOME | End: 2019-07-25
Attending: OTOLARYNGOLOGY
Payer: COMMERCIAL

## 2019-07-25 DIAGNOSIS — J33.9 NASAL POLYP, UNSPECIFIED: ICD-10-CM

## 2019-07-25 PROCEDURE — 70486 CT MAXILLOFACIAL W/O DYE: CPT

## 2019-07-29 ENCOUNTER — HOSPITAL ENCOUNTER (EMERGENCY)
Age: 34
Discharge: HOME | End: 2019-07-29
Attending: EMERGENCY MEDICINE
Payer: COMMERCIAL

## 2019-07-29 VITALS
TEMPERATURE: 97.6 F | OXYGEN SATURATION: 98 % | SYSTOLIC BLOOD PRESSURE: 105 MMHG | HEART RATE: 86 BPM | RESPIRATION RATE: 16 BRPM | DIASTOLIC BLOOD PRESSURE: 73 MMHG

## 2019-07-29 DIAGNOSIS — R10.84 ABDOMINAL PAIN, GENERALIZED: ICD-10-CM

## 2019-07-29 DIAGNOSIS — R19.7 DIARRHEA, UNSPECIFIED TYPE: Primary | ICD-10-CM

## 2019-07-29 LAB
ALBUMIN SERPL-MCNC: 3.4 G/DL (ref 3.5–5)
ALBUMIN/GLOB SERPL: 1 {RATIO} (ref 1.1–2.2)
ALP SERPL-CCNC: 65 U/L (ref 45–117)
ALT SERPL-CCNC: 30 U/L (ref 12–78)
ANION GAP SERPL CALC-SCNC: 8 MMOL/L (ref 5–15)
APPEARANCE UR: CLEAR
AST SERPL-CCNC: 10 U/L (ref 15–37)
BASOPHILS # BLD: 0 K/UL (ref 0–0.1)
BASOPHILS NFR BLD: 0 % (ref 0–1)
BILIRUB SERPL-MCNC: 0.3 MG/DL (ref 0.2–1)
BILIRUB UR QL: NEGATIVE
BUN SERPL-MCNC: 18 MG/DL (ref 6–20)
BUN/CREAT SERPL: 24 (ref 12–20)
CALCIUM SERPL-MCNC: 9 MG/DL (ref 8.5–10.1)
CHLORIDE SERPL-SCNC: 104 MMOL/L (ref 97–108)
CO2 SERPL-SCNC: 27 MMOL/L (ref 21–32)
COLOR UR: ABNORMAL
COMMENT, HOLDF: NORMAL
CREAT SERPL-MCNC: 0.74 MG/DL (ref 0.55–1.02)
DIFFERENTIAL METHOD BLD: ABNORMAL
EOSINOPHIL # BLD: 0.2 K/UL (ref 0–0.4)
EOSINOPHIL NFR BLD: 2 % (ref 0–7)
ERYTHROCYTE [DISTWIDTH] IN BLOOD BY AUTOMATED COUNT: 14.1 % (ref 11.5–14.5)
GLOBULIN SER CALC-MCNC: 3.5 G/DL (ref 2–4)
GLUCOSE SERPL-MCNC: 86 MG/DL (ref 65–100)
GLUCOSE UR STRIP.AUTO-MCNC: NEGATIVE MG/DL
HCT VFR BLD AUTO: 43.8 % (ref 35–47)
HGB BLD-MCNC: 13.6 G/DL (ref 11.5–16)
HGB UR QL STRIP: NEGATIVE
IMM GRANULOCYTES # BLD AUTO: 0.1 K/UL (ref 0–0.04)
IMM GRANULOCYTES NFR BLD AUTO: 1 % (ref 0–0.5)
KETONES UR QL STRIP.AUTO: NEGATIVE MG/DL
LEUKOCYTE ESTERASE UR QL STRIP.AUTO: NEGATIVE
LIPASE SERPL-CCNC: 170 U/L (ref 73–393)
LYMPHOCYTES # BLD: 3 K/UL (ref 0.8–3.5)
LYMPHOCYTES NFR BLD: 27 % (ref 12–49)
MCH RBC QN AUTO: 28.5 PG (ref 26–34)
MCHC RBC AUTO-ENTMCNC: 31.1 G/DL (ref 30–36.5)
MCV RBC AUTO: 91.8 FL (ref 80–99)
MONOCYTES # BLD: 0.8 K/UL (ref 0–1)
MONOCYTES NFR BLD: 7 % (ref 5–13)
NEUTS SEG # BLD: 6.9 K/UL (ref 1.8–8)
NEUTS SEG NFR BLD: 63 % (ref 32–75)
NITRITE UR QL STRIP.AUTO: NEGATIVE
NRBC # BLD: 0 K/UL (ref 0–0.01)
NRBC BLD-RTO: 0 PER 100 WBC
PH UR STRIP: 5.5 [PH] (ref 5–8)
PLATELET # BLD AUTO: 320 K/UL (ref 150–400)
PMV BLD AUTO: 9.2 FL (ref 8.9–12.9)
POTASSIUM SERPL-SCNC: 3.4 MMOL/L (ref 3.5–5.1)
PROT SERPL-MCNC: 6.9 G/DL (ref 6.4–8.2)
PROT UR STRIP-MCNC: NEGATIVE MG/DL
RBC # BLD AUTO: 4.77 M/UL (ref 3.8–5.2)
SAMPLES BEING HELD,HOLD: NORMAL
SODIUM SERPL-SCNC: 139 MMOL/L (ref 136–145)
SP GR UR REFRACTOMETRY: >1.03 (ref 1–1.03)
UR CULT HOLD, URHOLD: NORMAL
UROBILINOGEN UR QL STRIP.AUTO: 0.2 EU/DL (ref 0.2–1)
WBC # BLD AUTO: 11 K/UL (ref 3.6–11)

## 2019-07-29 PROCEDURE — 99283 EMERGENCY DEPT VISIT LOW MDM: CPT

## 2019-07-29 PROCEDURE — 96372 THER/PROPH/DIAG INJ SC/IM: CPT

## 2019-07-29 PROCEDURE — 96361 HYDRATE IV INFUSION ADD-ON: CPT

## 2019-07-29 PROCEDURE — 80053 COMPREHEN METABOLIC PANEL: CPT

## 2019-07-29 PROCEDURE — 74011250636 HC RX REV CODE- 250/636: Performed by: EMERGENCY MEDICINE

## 2019-07-29 PROCEDURE — 85025 COMPLETE CBC W/AUTO DIFF WBC: CPT

## 2019-07-29 PROCEDURE — 36415 COLL VENOUS BLD VENIPUNCTURE: CPT

## 2019-07-29 PROCEDURE — 83690 ASSAY OF LIPASE: CPT

## 2019-07-29 PROCEDURE — 96374 THER/PROPH/DIAG INJ IV PUSH: CPT

## 2019-07-29 PROCEDURE — 81003 URINALYSIS AUTO W/O SCOPE: CPT

## 2019-07-29 RX ORDER — ONDANSETRON 2 MG/ML
4 INJECTION INTRAMUSCULAR; INTRAVENOUS
Status: COMPLETED | OUTPATIENT
Start: 2019-07-29 | End: 2019-07-29

## 2019-07-29 RX ORDER — ONDANSETRON 4 MG/1
4 TABLET, ORALLY DISINTEGRATING ORAL
Qty: 12 TAB | Refills: 0 | Status: SHIPPED | OUTPATIENT
Start: 2019-07-29 | End: 2020-09-24

## 2019-07-29 RX ORDER — DICYCLOMINE HYDROCHLORIDE 20 MG/1
20 TABLET ORAL EVERY 6 HOURS
Qty: 20 TAB | Refills: 0 | Status: SHIPPED | OUTPATIENT
Start: 2019-07-29 | End: 2019-08-03

## 2019-07-29 RX ORDER — DICYCLOMINE HYDROCHLORIDE 10 MG/ML
20 INJECTION INTRAMUSCULAR
Status: COMPLETED | OUTPATIENT
Start: 2019-07-29 | End: 2019-07-29

## 2019-07-29 RX ADMIN — DICYCLOMINE HYDROCHLORIDE 20 MG: 20 INJECTION, SOLUTION INTRAMUSCULAR at 14:13

## 2019-07-29 RX ADMIN — SODIUM CHLORIDE 1000 ML: 900 INJECTION, SOLUTION INTRAVENOUS at 14:10

## 2019-07-29 RX ADMIN — ONDANSETRON 4 MG: 2 INJECTION INTRAMUSCULAR; INTRAVENOUS at 14:12

## 2019-07-29 NOTE — LETTER
NOTIFICATION RETURN TO WORK / SCHOOL 
 
7/29/2019 4:27 PM 
 
Ms. Braxton Rossi [address] [address] To Whom It May Concern: 
 
Braxton Rossi is currently under the care of [facility]. She will return to work/school on: 7/31/19 If there are questions or concerns please have the patient contact our office. Sincerely, Sky Gallo NP

## 2019-07-29 NOTE — DISCHARGE INSTRUCTIONS
Patient Education        Diarrhea: Care Instructions  Your Care Instructions    Diarrhea is loose, watery stools (bowel movements). The exact cause is often hard to find. Sometimes diarrhea is your body's way of getting rid of what caused an upset stomach. Viruses, food poisoning, and many medicines can cause diarrhea. Some people get diarrhea in response to emotional stress, anxiety, or certain foods. Almost everyone has diarrhea now and then. It usually isn't serious, and your stools will return to normal soon. The important thing to do is replace the fluids you have lost, so you can prevent dehydration. The doctor has checked you carefully, but problems can develop later. If you notice any problems or new symptoms, get medical treatment right away. Follow-up care is a key part of your treatment and safety. Be sure to make and go to all appointments, and call your doctor if you are having problems. It's also a good idea to know your test results and keep a list of the medicines you take. How can you care for yourself at home? · Watch for signs of dehydration, which means your body has lost too much water. Dehydration is a serious condition and should be treated right away. Signs of dehydration are:  ? Increasing thirst and dry eyes and mouth. ? Feeling faint or lightheaded. ? Darker urine, and a smaller amount of urine than normal.  · To prevent dehydration, drink plenty of fluids, enough so that your urine is light yellow or clear like water. Choose water and other caffeine-free clear liquids until you feel better. If you have kidney, heart, or liver disease and have to limit fluids, talk with your doctor before you increase the amount of fluids you drink. · Begin eating small amounts of mild foods the next day, if you feel like it. ? Try yogurt that has live cultures of Lactobacillus. (Check the label.)  ?  Avoid spicy foods, fruits, alcohol, and caffeine until 48 hours after all symptoms are gone.  ? Avoid chewing gum that contains sorbitol. ? Avoid dairy products (except for yogurt with Lactobacillus) while you have diarrhea and for 3 days after symptoms are gone. · The doctor may recommend that you take over-the-counter medicine, such as loperamide (Imodium), if you still have diarrhea after 6 hours. Read and follow all instructions on the label. Do not use this medicine if you have bloody diarrhea, a high fever, or other signs of serious illness. Call your doctor if you think you are having a problem with your medicine. When should you call for help? Call 911 anytime you think you may need emergency care. For example, call if:    · You passed out (lost consciousness).     · Your stools are maroon or very bloody.    Call your doctor now or seek immediate medical care if:    · You are dizzy or lightheaded, or you feel like you may faint.     · Your stools are black and look like tar, or they have streaks of blood.     · You have new or worse belly pain.     · You have symptoms of dehydration, such as:  ? Dry eyes and a dry mouth. ? Passing only a little dark urine. ? Feeling thirstier than usual.     · You have a new or higher fever.    Watch closely for changes in your health, and be sure to contact your doctor if:    · Your diarrhea is getting worse.     · You see pus in the diarrhea.     · You are not getting better after 2 days (48 hours). Where can you learn more? Go to http://ariadne-gwyn.info/. Enter V833 in the search box to learn more about \"Diarrhea: Care Instructions. \"  Current as of: September 23, 2018  Content Version: 12.1  © 8940-0775 Healthwise, [employer]. Care instructions adapted under license by [employer] (which disclaims liability or warranty for this information).  If you have questions about a medical condition or this instruction, always ask your healthcare professional. [facility] [address] any warranty or liability for your use of this information. Patient Education        Abdominal Pain: Care Instructions  Your Care Instructions    Abdominal pain has many possible causes. Some aren't serious and get better on their own in a few days. Others need more testing and treatment. If your pain continues or gets worse, you need to be rechecked and may need more tests to find out what is wrong. You may need surgery to correct the problem. Don't ignore new symptoms, such as fever, nausea and vomiting, urination problems, pain that gets worse, and dizziness. These may be signs of a more serious problem. Your doctor may have recommended a follow-up visit in the next 8 to 12 hours. If you are not getting better, you may need more tests or treatment. The doctor has checked you carefully, but problems can develop later. If you notice any problems or new symptoms, get medical treatment right away. Follow-up care is a key part of your treatment and safety. Be sure to make and go to all appointments, and call your doctor if you are having problems. It's also a good idea to know your test results and keep a list of the medicines you take. How can you care for yourself at home? · Rest until you feel better. · To prevent dehydration, drink plenty of fluids, enough so that your urine is light yellow or clear like water. Choose water and other caffeine-free clear liquids until you feel better. If you have kidney, heart, or liver disease and have to limit fluids, talk with your doctor before you increase the amount of fluids you drink. · If your stomach is upset, eat mild foods, such as rice, dry toast or crackers, bananas, and applesauce. Try eating several small meals instead of two or three large ones. · Wait until 48 hours after all symptoms have gone away before you have spicy foods, alcohol, and drinks that contain caffeine. · Do not eat foods that are high in fat.   · Avoid anti-inflammatory medicines such as aspirin, ibuprofen (Advil, Motrin), and naproxen (Aleve). These can cause stomach upset. Talk to your doctor if you take daily aspirin for another health problem. When should you call for help? Call 911 anytime you think you may need emergency care. For example, call if:    · You passed out (lost consciousness).     · You pass maroon or very bloody stools.     · You vomit blood or what looks like coffee grounds.     · You have new, severe belly pain.    Call your doctor now or seek immediate medical care if:    · Your pain gets worse, especially if it becomes focused in one area of your belly.     · You have a new or higher fever.     · Your stools are black and look like tar, or they have streaks of blood.     · You have unexpected vaginal bleeding.     · You have symptoms of a urinary tract infection. These may include:  ? Pain when you urinate. ? Urinating more often than usual.  ? Blood in your urine.     · You are dizzy or lightheaded, or you feel like you may faint.    Watch closely for changes in your health, and be sure to contact your doctor if:    · You are not getting better after 1 day (24 hours). Where can you learn more? Go to http://ariadne-gwyn.info/. Enter U516 in the search box to learn more about \"Abdominal Pain: Care Instructions. \"  Current as of: September 23, 2018  Content Version: 12.1  © 4744-1557 Healthwise, [employer]. Care instructions adapted under license by [employer] (which disclaims liability or warranty for this information). If you have questions about a medical condition or this instruction, always ask your healthcare professional. [address] any warranty or liability for your use of this information.

## 2019-07-29 NOTE — ED PROVIDER NOTES
HPI patient presents to the ER reporting 2 days of episodic nonbloody diarrhea and generalized abdominal pain. She has been taking Augmentin and prednisone as prescribed by Dr. Marcela Spencer for a sinus infection. Denies fever, cold symptoms,headache, neck pain, visual changes, focal weakness or rash. Denies any difficulty breathing, difficulty swallowing, SOB or chest pain. Denies any nausea, vomiting or urinary symptoms. Pt. Reports that she has not had any pain medications today prior to arrival.  LMP 7/10/19.     Past Medical History:   Diagnosis Date    Breast disorder     Breast mass, right 8/2015       Past Surgical History:   Procedure Laterality Date    HX BREAST BIOPSY Right 8/11/2015    RIGHT BREAST EXCISIONAL BIOPSY W ULTRASOUND  performed by Radha Childs MD at [address] BREAST BIOPSY Right 2015    benign         Family History:   Problem Relation Age of Onset    Heart Attack Father        Social History     Socioeconomic History    Marital status:      Spouse name: Not on file    Number of children: Not on file    Years of education: Not on file    Highest education level: Not on file   Occupational History    Not on file   Social Needs    Financial resource strain: Not on file    Food insecurity:     Worry: Not on file     Inability: Not on file    Transportation needs:     Medical: Not on file     Non-medical: Not on file   Tobacco Use    Smoking status: Never Smoker    Smokeless tobacco: Never Used   Substance and Sexual Activity    Alcohol use: No    Drug use: No    Sexual activity: Yes     Partners: Male     Birth control/protection: Injection   Lifestyle    Physical activity:     Days per week: Not on file     Minutes per session: Not on file    Stress: Not on file   Relationships    Social connections:     Talks on phone: Not on file     Gets together: Not on file     Attends Hinduism service: Not on file     Active member of club or organization: Not on file     Attends meetings of clubs or organizations: Not on file     Relationship status: Not on file    Intimate partner violence:     Fear of current or ex partner: Not on file     Emotionally abused: Not on file     Physically abused: Not on file     Forced sexual activity: Not on file   Other Topics Concern    Not on file   Social History Narrative    Not on file         ALLERGIES: Patient has no known allergies. Review of Systems   Constitutional: Negative for activity change, appetite change, fever and unexpected weight change. HENT: Negative for congestion, sore throat and trouble swallowing. Respiratory: Negative for cough and shortness of breath. Cardiovascular: Negative for chest pain, palpitations and leg swelling. Gastrointestinal: Positive for abdominal pain and diarrhea. Negative for nausea and vomiting. Genitourinary: Negative for dysuria. Musculoskeletal: Negative for arthralgias and myalgias. Skin: Negative for rash. Neurological: Negative for dizziness, numbness and headaches. All other systems reviewed and are negative. Vitals:    07/29/19 1251 07/29/19 1344   BP:  113/68   Pulse: (!) 112 (!) 103   Resp:  16   Temp:  98.6 °F (37 °C)   SpO2: 98% 98%            Physical Exam   Constitutional: She is oriented to person, place, and time. She appears well-developed and well-nourished. [place] female;non smoker; works at [employer]:   Right Ear: External ear normal.   Left Ear: External ear normal.   Nose: Nose normal.   Mouth/Throat: Oropharynx is clear and moist.   Neck: Normal range of motion. Neck supple. Pulmonary/Chest: Effort normal and breath sounds normal.   Abdominal: Soft. Bowel sounds are normal. She exhibits no distension and no mass. There is tenderness. There is no rebound and no guarding. No hernia. Musculoskeletal: Normal range of motion. Neurological: She is alert and oriented to person, place, and time.    Skin: Skin is warm and dry. No rash noted. Psychiatric: She has a normal mood and affect. Her behavior is normal.   Nursing note and vitals reviewed. MDM       Procedures      Patient has been reexamined and is presently pain-free. Plan to send home with a short course of Zofran and Bentyl to take as needed; close follow-up with gastroenterology if symptoms persist.    Patient's results and plan of care have been reviewed with her and her daughter. Patient and/or family have verbally conveyed their understanding and agreement of the patient's signs, symptoms, diagnosis, treatment and prognosis and additionally agree to follow up as recommended or return to the Emergency Room should her condition change prior to follow-up. Discharge instructions have also been provided to the patient with some educational information regarding her diagnosis as well a list of reasons why she would want to return to the ER prior to her follow-up appointment should her condition change. Ernie Grimes NP

## 2019-08-06 ENCOUNTER — OFFICE VISIT (OUTPATIENT)
Dept: FAMILY MEDICINE CLINIC | Age: 34
End: 2019-08-06

## 2019-08-06 VITALS
RESPIRATION RATE: 16 BRPM | BODY MASS INDEX: 32.2 KG/M2 | TEMPERATURE: 98 F | OXYGEN SATURATION: 98 % | HEIGHT: 62 IN | WEIGHT: 175 LBS | DIASTOLIC BLOOD PRESSURE: 72 MMHG | HEART RATE: 70 BPM | SYSTOLIC BLOOD PRESSURE: 114 MMHG

## 2019-08-06 DIAGNOSIS — Z13.220 SCREENING, LIPID: ICD-10-CM

## 2019-08-06 DIAGNOSIS — Z86.018 HISTORY OF BENIGN BREAST TUMOR: ICD-10-CM

## 2019-08-06 DIAGNOSIS — Z13.1 SCREENING FOR DIABETES MELLITUS: ICD-10-CM

## 2019-08-06 DIAGNOSIS — Z00.00 ROUTINE GENERAL MEDICAL EXAMINATION AT A HEALTH CARE FACILITY: Primary | ICD-10-CM

## 2019-08-06 DIAGNOSIS — N64.89: ICD-10-CM

## 2019-08-06 RX ORDER — AMOXICILLIN AND CLAVULANATE POTASSIUM 875; 125 MG/1; MG/1
TABLET, FILM COATED ORAL
Refills: 0 | COMMUNITY
Start: 2019-07-13 | End: 2019-08-06

## 2019-08-06 RX ORDER — DICYCLOMINE HYDROCHLORIDE 20 MG/1
TABLET ORAL
Refills: 0 | COMMUNITY
Start: 2019-07-29 | End: 2020-02-13

## 2019-08-06 RX ORDER — ONDANSETRON 4 MG/1
TABLET, ORALLY DISINTEGRATING ORAL
Refills: 0 | COMMUNITY
Start: 2019-07-29 | End: 2020-02-13

## 2019-08-06 RX ORDER — PREDNISONE 10 MG/1
TABLET ORAL
Refills: 0 | COMMUNITY
Start: 2019-07-11 | End: 2019-08-06

## 2019-08-06 NOTE — PROGRESS NOTES
Assessment/ Plan:   Full age appropriate History and Physical exam as well as health care maintenance  performed and discussed today. Risk factor modification discussed today includes safe sex practices, healthy diet and exercise, and seat belt use. Continue current medications. Diagnoses and all orders for this visit:    1. Routine general medical examination at a health care facility  -     CBC WITH AUTOMATED DIFF  -     METABOLIC PANEL, COMPREHENSIVE  -     TSH 3RD GENERATION  Non-fasting labs today. 2. Screening, lipid  -     LIPID PANEL    3. Screening for diabetes mellitus  -     HEMOGLOBIN A1C WITH EAG    I have reviewed/discussed the above normal BMI with the patient. I have recommended the following interventions: dietary management education, guidance, and counseling, encourage exercise, monitor weight and prescribed dietary intake. Given written instructions as well. 4. Acquired nipple deformity  -     REFERRAL TO PLASTIC SURGERY to discuss revision. 5. History of benign breast tumor  -     US BREASTS COMPLETE; Future  US for follow up. Follow-up and Dispositions    · Return in about 1 year (around 8/6/2020) for Complete Physical.          Discussed expected course/resolution/complications of diagnosis in detail with patient.    Medication risks/benefits/costs/interactions/alternatives discussed with patient.    Pt was given after visit summary which includes diagnoses, current medications & vitals. Pt expressed understanding with the diagnosis and plan      HPI:   Alex Springer is a 28 y.o. female who presents for annual exam.    She had a breast reduction last year and is having difficulty with the suture line healing. She has also undergone lumpectomy for benign mass. Reports a 3 month history of lumbar back pain. This is her first evaluation. Not attempting otc treatment. She is followed by an ob/gyn. She is up to date on pap smear.           Current Outpatient Medications   Medication Sig Dispense Refill    ondansetron (ZOFRAN ODT) 4 mg disintegrating tablet DISSOLVE 1 TABLET EVERY 8 HOURS AS NEEDED FOR NAUSEA  0    dicyclomine (BENTYL) 20 mg tablet TAKE 1 TABLET BY MOUTH EVERY 6 HOURS  0      No Known Allergies   There is no problem list on file for this patient. History reviewed. No pertinent past medical history. Past Surgical History:   Procedure Laterality Date    HX BREAST BIOPSY Right 2015    Surgical removal of benign tissue    HX BREAST REDUCTION        Patient's last menstrual period was 07/10/2019 (exact date).    Family History   Problem Relation Age of Onset    No Known Problems Mother     Diabetes Father     No Known Problems Son     No Known Problems Daughter     No Known Problems Daughter     No Known Problems Daughter     No Known Problems Daughter       Social History     Socioeconomic History    Marital status:      Spouse name: Not on file    Number of children: Not on file    Years of education: Not on file    Highest education level: Not on file   Occupational History    Not on file   Social Needs    Financial resource strain: Not on file    Food insecurity:     Worry: Not on file     Inability: Not on file    Transportation needs:     Medical: Not on file     Non-medical: Not on file   Tobacco Use    Smoking status: Never Smoker    Smokeless tobacco: Never Used   Substance and Sexual Activity    Alcohol use: Never     Frequency: Never    Drug use: Never    Sexual activity: Yes     Partners: Male     Birth control/protection: IUD   Lifestyle    Physical activity:     Days per week: Not on file     Minutes per session: Not on file    Stress: Not on file   Relationships    Social connections:     Talks on phone: Not on file     Gets together: Not on file     Attends [employer] service: Not on file     Active member of club or organization: Not on file     Attends meetings of clubs or organizations: Not on file Relationship status: Not on file    Intimate partner violence:     Fear of current or ex partner: Not on file     Emotionally abused: Not on file     Physically abused: Not on file     Forced sexual activity: Not on file   Other Topics Concern    Not on file   Social History Narrative    Not on file        ROS:     Review of Systems   Constitutional: Negative for fever, malaise/fatigue and weight loss. HENT: Negative for hearing loss. Eyes: Negative for blurred vision and pain. Respiratory: Negative for cough and shortness of breath. Cardiovascular: Negative for chest pain, palpitations and leg swelling. Gastrointestinal: Negative for abdominal pain, blood in stool, constipation, diarrhea and melena. Genitourinary: Negative for dysuria and hematuria. Musculoskeletal: Negative for joint pain. Skin: Negative for rash. Neurological: Negative for headaches. Psychiatric/Behavioral: Negative for depression. The patient is not nervous/anxious and does not have insomnia. Physical Exam:     Visit Vitals  /72   Pulse 70   Temp 98 °F (36.7 °C) (Oral)   Resp 16   Ht 5' 2\" (1.575 m)   Wt 175 lb (79.4 kg)   LMP 07/10/2019 (Exact Date)   SpO2 98%   BMI 32.01 kg/m²        Nursing Documentation and Vital Signs Reviewed. Physical Exam   Constitutional: No distress. HENT:   Right Ear: No drainage. Tympanic membrane is not injected, not erythematous and not bulging. No middle ear effusion. Left Ear: No drainage. Tympanic membrane is not injected, not erythematous and not bulging. No middle ear effusion. Nose: No mucosal edema or rhinorrhea. Mouth/Throat: Normal dentition. No oropharyngeal exudate, posterior oropharyngeal erythema or tonsillar abscesses. Eyes: Pupils are equal, round, and reactive to light. Neck: No JVD present. Carotid bruit is not present. No tracheal deviation present. No thyroid mass and no thyromegaly present.    Cardiovascular: S1 normal and S2 normal. Exam reveals no gallop and no friction rub. No murmur heard. Pulses:       Dorsalis pedis pulses are 2+ on the right side, and 2+ on the left side. Posterior tibial pulses are 2+ on the right side, and 2+ on the left side. Pulmonary/Chest: Breath sounds normal. She has no wheezes. Right breast exhibits no inverted nipple, no mass, no nipple discharge, no skin change and no tenderness. Left breast exhibits inverted nipple. Left breast exhibits no mass, no nipple discharge, no skin change and no tenderness. Breasts are asymmetrical (nipple asymmetry. Well healed surgical scars from breast reduction). Abdominal: Soft. Bowel sounds are normal. She exhibits no distension and no mass. There is no hepatosplenomegaly. There is no tenderness. Lymphadenopathy:     She has no cervical adenopathy. She has no axillary adenopathy. Neurological: She has normal motor skills, normal sensation and normal strength. No cranial nerve deficit. Gait normal.   Skin: Skin is warm and dry.    Psychiatric: Mood, memory, affect and judgment normal.

## 2019-08-06 NOTE — PROGRESS NOTES
Chief Complaint   Patient presents with   Cassytaz Carranza Establish Care     1. Have you been to the ER, urgent care clinic since your last visit? Hospitalized since your last visit? No    2. Have you seen or consulted any other health care providers outside of the [address] [place] [address] since your last visit? Include any pap smears or colon screening.  No

## 2019-08-06 NOTE — PATIENT INSTRUCTIONS
Well Visit, Ages 25 to 48: Care Instructions Your Care Instructions Physical exams can help you stay healthy. Your doctor has checked your overall health and may have suggested ways to take good care of yourself. He or she also may have recommended tests. At home, you can help prevent illness with healthy eating, regular exercise, and other steps. Follow-up care is a key part of your treatment and safety. Be sure to make and go to all appointments, and call your doctor if you are having problems. It's also a good idea to know your test results and keep a list of the medicines you take. How can you care for yourself at home? · Reach and stay at a healthy weight. This will lower your risk for many problems, such as obesity, diabetes, heart disease, and high blood pressure. · Get at least 30 minutes of physical activity on most days of the week. Walking is a good choice. You also may want to do other activities, such as running, swimming, cycling, or playing tennis or team sports. Discuss any changes in your exercise program with your doctor. · Do not smoke or allow others to smoke around you. If you need help quitting, talk to your doctor about stop-smoking programs and medicines. These can increase your chances of quitting for good. · Talk to your doctor about whether you have any risk factors for sexually transmitted infections (STIs). Having one sex partner (who does not have STIs and does not have sex with anyone else) is a good way to avoid these infections. · Use birth control if you do not want to have children at this time. Talk with your doctor about the choices available and what might be best for you. · Protect your skin from too much sun. When you're outdoors from 10 a.m. to 4 p.m., stay in the shade or cover up with clothing and a hat with a wide brim. Wear sunglasses that block UV rays. Even when it's cloudy, put broad-spectrum sunscreen (SPF 30 or higher) on any exposed skin. · See a dentist one or two times a year for checkups and to have your teeth cleaned. · Wear a seat belt in the car. Follow your doctor's advice about when to have certain tests. These tests can spot problems early. For everyone · Cholesterol. Have the fat (cholesterol) in your blood tested after age 21. Your doctor will tell you how often to have this done based on your age, family history, or other things that can increase your risk for heart disease. · Blood pressure. Have your blood pressure checked during a routine doctor visit. Your doctor will tell you how often to check your blood pressure based on your age, your blood pressure results, and other factors. · Vision. Talk with your doctor about how often to have a glaucoma test. 
· Diabetes. Ask your doctor whether you should have tests for diabetes. · Colon cancer. Your risk for colorectal cancer gets higher as you get older. Some experts say that adults should start regular screening at age 48 and stop at age 76. Others say to start before age 48 or continue after age 76. Talk with your doctor about your risk and when to start and stop screening. For women · Breast exam and mammogram. Talk to your doctor about when you should have a clinical breast exam and a mammogram. Medical experts differ on whether and how often women under 50 should have these tests. Your doctor can help you decide what is right for you. · Cervical cancer screening test and pelvic exam. Begin with a Pap test at age 24. The test often is part of a pelvic exam. Starting at age 27, you may choose to have a Pap test, an HPV test, or both tests at the same time (called co-testing). Talk with your doctor about how often to have testing. · Tests for sexually transmitted infections (STIs). Ask whether you should have tests for STIs. You may be at risk if you have sex with more than one person, especially if your partners do not wear condoms. For men · Tests for sexually transmitted infections (STIs). Ask whether you should have tests for STIs. You may be at risk if you have sex with more than one person, especially if you do not wear a condom. · Testicular cancer exam. Ask your doctor whether you should check your testicles regularly. · Prostate exam. Talk to your doctor about whether you should have a blood test (called a PSA test) for prostate cancer. Experts differ on whether and when men should have this test. Some experts suggest it if you are older than 39 and are -American or have a father or brother who got prostate cancer when he was younger than 72. When should you call for help? Watch closely for changes in your health, and be sure to contact your doctor if you have any problems or symptoms that concern you. Where can you learn more? Go to http://ariadne-gwyn.info/. Enter P072 in the search box to learn more about \"Well Visit, Ages 25 to 48: Care Instructions. \" Current as of: December 13, 2018 Content Version: 12.1 © 8031-0572 Healthwise, [employer]orporated. Care instructions adapted under license by [employer] (which disclaims liability or warranty for this information). If you have questions about a medical condition or this instruction, always ask your healthcare professional. [address] any warranty or liability for your use of this information.

## 2019-08-07 LAB
ALBUMIN SERPL-MCNC: 4 G/DL (ref 3.5–5.5)
ALBUMIN/GLOB SERPL: 1.7 {RATIO} (ref 1.2–2.2)
ALP SERPL-CCNC: 55 IU/L (ref 39–117)
ALT SERPL-CCNC: 19 IU/L (ref 0–32)
AST SERPL-CCNC: 11 IU/L (ref 0–40)
BASOPHILS # BLD AUTO: 0 X10E3/UL (ref 0–0.2)
BASOPHILS NFR BLD AUTO: 0 %
BILIRUB SERPL-MCNC: <0.2 MG/DL (ref 0–1.2)
BUN SERPL-MCNC: 11 MG/DL (ref 6–20)
BUN/CREAT SERPL: 16 (ref 9–23)
CALCIUM SERPL-MCNC: 9.6 MG/DL (ref 8.7–10.2)
CHLORIDE SERPL-SCNC: 106 MMOL/L (ref 96–106)
CHOLEST SERPL-MCNC: 221 MG/DL (ref 100–199)
CO2 SERPL-SCNC: 21 MMOL/L (ref 20–29)
CREAT SERPL-MCNC: 0.69 MG/DL (ref 0.57–1)
EOSINOPHIL # BLD AUTO: 0.5 X10E3/UL (ref 0–0.4)
EOSINOPHIL NFR BLD AUTO: 5 %
ERYTHROCYTE [DISTWIDTH] IN BLOOD BY AUTOMATED COUNT: 13.6 % (ref 12.3–15.4)
EST. AVERAGE GLUCOSE BLD GHB EST-MCNC: 117 MG/DL
GLOBULIN SER CALC-MCNC: 2.3 G/DL (ref 1.5–4.5)
GLUCOSE SERPL-MCNC: 80 MG/DL (ref 65–99)
HBA1C MFR BLD: 5.7 % (ref 4.8–5.6)
HCT VFR BLD AUTO: 40.5 % (ref 34–46.6)
HDLC SERPL-MCNC: 56 MG/DL
HGB BLD-MCNC: 12.9 G/DL (ref 11.1–15.9)
IMM GRANULOCYTES # BLD AUTO: 0 X10E3/UL (ref 0–0.1)
IMM GRANULOCYTES NFR BLD AUTO: 0 %
INTERPRETATION, 910389: NORMAL
LDLC SERPL CALC-MCNC: 143 MG/DL (ref 0–99)
LYMPHOCYTES # BLD AUTO: 2.9 X10E3/UL (ref 0.7–3.1)
LYMPHOCYTES NFR BLD AUTO: 30 %
MCH RBC QN AUTO: 28.7 PG (ref 26.6–33)
MCHC RBC AUTO-ENTMCNC: 31.9 G/DL (ref 31.5–35.7)
MCV RBC AUTO: 90 FL (ref 79–97)
MONOCYTES # BLD AUTO: 0.4 X10E3/UL (ref 0.1–0.9)
MONOCYTES NFR BLD AUTO: 4 %
NEUTROPHILS # BLD AUTO: 5.7 X10E3/UL (ref 1.4–7)
NEUTROPHILS NFR BLD AUTO: 61 %
PLATELET # BLD AUTO: 392 X10E3/UL (ref 150–450)
POTASSIUM SERPL-SCNC: 4.5 MMOL/L (ref 3.5–5.2)
PROT SERPL-MCNC: 6.3 G/DL (ref 6–8.5)
RBC # BLD AUTO: 4.49 X10E6/UL (ref 3.77–5.28)
SODIUM SERPL-SCNC: 139 MMOL/L (ref 134–144)
TRIGL SERPL-MCNC: 112 MG/DL (ref 0–149)
TSH SERPL DL<=0.005 MIU/L-ACNC: 0.68 UIU/ML (ref 0.45–4.5)
VLDLC SERPL CALC-MCNC: 22 MG/DL (ref 5–40)
WBC # BLD AUTO: 9.6 X10E3/UL (ref 3.4–10.8)

## 2019-08-08 NOTE — PROGRESS NOTES
She has prediabetes. Cholesterol is mildly elevated. Would like her to work towards weight loss and we will recheck in 6 months.

## 2019-08-12 DIAGNOSIS — Z86.018 HISTORY OF BENIGN BREAST TUMOR: Primary | ICD-10-CM

## 2019-09-05 ENCOUNTER — OFFICE VISIT (OUTPATIENT)
Dept: FAMILY MEDICINE CLINIC | Age: 34
End: 2019-09-05

## 2019-09-05 VITALS
RESPIRATION RATE: 16 BRPM | HEART RATE: 82 BPM | TEMPERATURE: 97.8 F | HEIGHT: 62 IN | SYSTOLIC BLOOD PRESSURE: 119 MMHG | WEIGHT: 177.6 LBS | BODY MASS INDEX: 32.68 KG/M2 | OXYGEN SATURATION: 100 % | DIASTOLIC BLOOD PRESSURE: 75 MMHG

## 2019-09-05 DIAGNOSIS — Z23 ENCOUNTER FOR IMMUNIZATION: ICD-10-CM

## 2019-09-05 DIAGNOSIS — N60.01 BENIGN CYST OF RIGHT BREAST: Primary | ICD-10-CM

## 2019-09-05 PROBLEM — Z34.90 PREGNANCY: Status: RESOLVED | Noted: 2017-09-12 | Resolved: 2019-09-05

## 2019-09-05 PROBLEM — O99.891 BACK PAIN AFFECTING PREGNANCY: Status: RESOLVED | Noted: 2017-07-19 | Resolved: 2019-09-05

## 2019-09-05 PROBLEM — R10.9 ABDOMINAL PAIN DURING PREGNANCY IN THIRD TRIMESTER: Status: RESOLVED | Noted: 2017-08-10 | Resolved: 2019-09-05

## 2019-09-05 PROBLEM — O26.893 ABDOMINAL PAIN DURING PREGNANCY IN THIRD TRIMESTER: Status: RESOLVED | Noted: 2017-08-10 | Resolved: 2019-09-05

## 2019-09-05 PROBLEM — M54.9 BACK PAIN AFFECTING PREGNANCY: Status: RESOLVED | Noted: 2017-07-19 | Resolved: 2019-09-05

## 2019-09-05 NOTE — PATIENT INSTRUCTIONS
Breast Lumps (Noncancerous): Care Instructions  Your Care Instructions  Breast lumps or changes are a common health worry for most women. Women may have many kinds of breast lumps and other breast changes throughout their lives, including ones that occur with menstrual periods, pregnancy, and aging. Most breast lumps are harmless and are not cancer. Many women's breasts feel lumpy and tender before their menstrual periods. Women also may have lumps when they are breastfeeding. Breast lumps may go away after menopause. Common noncancerous breast lumps include:  · Cysts, or sacs filled with fluid. · Fatty lumps, which may be firm. These may or may not cause pain. · Fibroadenomas, growths that are round and firm with smooth edges. · Abscesses, which are pockets of infection within the breast.  Follow-up care is a key part of your treatment and safety. Be sure to make and go to all appointments, and call your doctor if you are having problems. It's also a good idea to know your test results and keep a list of the medicines you take. How can you care for yourself at home? · Take an over-the-counter pain medicine, such as acetaminophen (Tylenol), ibuprofen (Advil, Motrin), or naproxen (Aleve). Read and follow all instructions on the label. · Do not take two or more pain medicines at the same time unless the doctor told you to. Many pain medicines have acetaminophen, which is Tylenol. Too much acetaminophen (Tylenol) can be harmful. · If you are pregnant, do not take any medicine other than acetaminophen unless your doctor has told you to. · Wear a supportive bra, such as a sports bra or jog bra. · You may want to limit caffeine. Some women say that cutting back on caffeine reduces their breast tenderness. · A diet very low in fat (about 15% of daily diet) may reduce breast tenderness. Talk to your doctor about whether you should try a very low-fat diet.   · A diet low in salt (sodium) also may reduce breast tenderness. When should you call for help? Watch closely for changes in your health, and be sure to contact your doctor if you:    · You do not get better as expected.     · Your breast has changed.     · You have pain in your breast.     · You have a discharge from your nipple.     · A breast lump changes or does not go away. Where can you learn more? Go to http://ariadne-gwyn.info/. Enter 95 [id number] in the search box to learn more about \"Breast Lumps (Noncancerous): Care Instructions. \"  Current as of: February 19, 2019  Content Version: 12.1  © 2566-4511 Healthwise, Incorporated. Care instructions adapted under license by [employer] (which disclaims liability or warranty for this information). If you have questions about a medical condition or this instruction, always ask your healthcare professional. [address] any warranty or liability for your use of this information.

## 2019-09-05 NOTE — PROGRESS NOTES
Assessment/Plan:     Diagnoses and all orders for this visit:    1. Benign cyst of right breast  Discussed the benign nature of the symptoms. She is already scheduled for mammogram.  Follow up in three months if symptoms persist.     2. Encounter for immunization  -     INFLUENZA VIRUS VAC QUAD,SPLIT,PRESV FREE SYRINGE IM  -     [place] IMMUNIZ ADMIN,1 SINGLE/COMB VAC/TOXOID        Follow-up and Dispositions    · Return in about 3 months (around 12/5/2019) for Follow Up. Discussed expected course/resolution/complications of diagnosis in detail with patient. Medication risks/benefits/costs/interactions/alternatives discussed with patient. Pt was given after visit summary which includes diagnoses, current medications & vitals. Pt expressed understanding with the diagnosis and plan          Subjective:      Jaspal Fields is a 28 y.o. female who presents for had concerns including Breast Problem (lump on right breast painful). Reports a two day history of right breast mass which is painful. Symptoms are stable over time. This is her first evaluation. Denies a history of similar symptoms. Menses is regular. Has not attempted otc treatment. There is a history of breast reduction with abnormal healing. She has recently met with a breast surgeon although the cost is too high to complete. There is a history of fibrocystic breast disease.      Patient Active Problem List   Diagnosis Code    Fibroadenoma of right breast D24.1    Mastodynia N64.4    Back pain affecting pregnancy O99.89, M54.9    Abdominal pain during pregnancy in third trimester O26.893, R10.9    Pregnancy Z34.90       Current Outpatient Medications   Medication Sig Dispense Refill    ondansetron (ZOFRAN ODT) 4 mg disintegrating tablet DISSOLVE 1 TABLET EVERY 8 HOURS AS NEEDED FOR NAUSEA  0    dicyclomine (BENTYL) 20 mg tablet TAKE 1 TABLET BY MOUTH EVERY 6 HOURS  0    ondansetron (ZOFRAN ODT) 4 mg disintegrating tablet Take 1 Tab by mouth every eight (8) hours as needed for Nausea. 12 Tab 0    ibuprofen (MOTRIN) 600 mg tablet Take 1 Tab by mouth every six (6) hours as needed for Pain. 30 Tab 1    oxyCODONE-acetaminophen (PERCOCET) 5-325 mg per tablet Take 1 Tab by mouth every six (6) hours as needed. Max Daily Amount: 4 Tabs. 30 Tab 0    triamcinolone (NASACORT AQ) 55 mcg nasal inhaler 2 Sprays daily.  PNV#16-Iron Fum & PS-FA-OM-3 35-1-200 mg cap Take  by mouth. No Known Allergies    ROS:   Review of Systems   Constitutional: Negative for fever, malaise/fatigue and weight loss. Eyes: Negative for blurred vision. Respiratory: Negative for shortness of breath. Cardiovascular: Negative for chest pain. Objective:     Visit Vitals  /75   Pulse 82   Temp 97.8 °F (36.6 °C) (Oral)   Resp 16   Ht 5' 2\" (1.575 m)   Wt 177 lb 9.6 oz (80.6 kg)   LMP 08/10/2019 (Approximate)   SpO2 100%   BMI 32.48 kg/m²       Vitals and Nurse Documentation reviewed. Physical Exam   Constitutional: She is well-developed, well-nourished, and in no distress. Pulmonary/Chest: Right breast exhibits inverted nipple and mass. Right breast exhibits no nipple discharge, no skin change and no tenderness. Left breast exhibits inverted nipple. Left breast exhibits no mass, no nipple discharge, no skin change and no tenderness. Breasts are symmetrical.       Well healed surgical scars. Lymphadenopathy:     She has no axillary adenopathy. Skin: Skin is warm and dry.    Psychiatric: Mood and affect normal.

## 2019-09-05 NOTE — PROGRESS NOTES
Chief Complaint   Patient presents with    Breast Problem     lump on right breast painful     1. Have you been to the ER, urgent care clinic since your last visit? Hospitalized since your last visit? No    2. Have you seen or consulted any other health care providers outside of the [address] [place] [address] since your last visit? Include any pap smears or colon screening.  No

## 2019-09-18 ENCOUNTER — HOSPITAL ENCOUNTER (OUTPATIENT)
Dept: MAMMOGRAPHY | Age: 34
Discharge: HOME | End: 2019-09-18
Attending: NURSE PRACTITIONER
Payer: COMMERCIAL

## 2019-09-18 ENCOUNTER — TELEPHONE (OUTPATIENT)
Dept: FAMILY MEDICINE CLINIC | Age: 34
End: 2019-09-18

## 2019-09-18 DIAGNOSIS — N63.10 MASS OF BREAST, RIGHT: ICD-10-CM

## 2019-09-18 DIAGNOSIS — N63.0 LUMP IN FEMALE BREAST: ICD-10-CM

## 2019-09-18 DIAGNOSIS — D24.1 FIBROADENOMA OF RIGHT BREAST: Primary | ICD-10-CM

## 2019-09-18 DIAGNOSIS — D24.1 FIBROADENOMA OF RIGHT BREAST: ICD-10-CM

## 2019-09-18 PROCEDURE — 76642 ULTRASOUND BREAST LIMITED: CPT

## 2019-09-18 PROCEDURE — 77066 DX MAMMO INCL CAD BI: CPT

## 2019-09-18 NOTE — TELEPHONE ENCOUNTER
Erik is calling stating that's pt is there with them right now and need the mammogram done because she is in lot of pain, her orders should say   The reason for mammogram - Rt breast pain and mass in order for them to do it now.       BCB# [phone]

## 2019-12-30 ENCOUNTER — HOSPITAL ENCOUNTER (EMERGENCY)
Age: 34
Discharge: HOME | End: 2019-12-30
Attending: STUDENT IN AN ORGANIZED HEALTH CARE EDUCATION/TRAINING PROGRAM | Admitting: STUDENT IN AN ORGANIZED HEALTH CARE EDUCATION/TRAINING PROGRAM
Payer: COMMERCIAL

## 2019-12-30 VITALS
DIASTOLIC BLOOD PRESSURE: 76 MMHG | WEIGHT: 175 LBS | TEMPERATURE: 98 F | BODY MASS INDEX: 32.2 KG/M2 | OXYGEN SATURATION: 99 % | SYSTOLIC BLOOD PRESSURE: 120 MMHG | HEART RATE: 76 BPM | RESPIRATION RATE: 20 BRPM | HEIGHT: 62 IN

## 2019-12-30 DIAGNOSIS — S39.012A BACK STRAIN, INITIAL ENCOUNTER: Primary | ICD-10-CM

## 2019-12-30 LAB
APPEARANCE UR: CLEAR
BACTERIA URNS QL MICRO: NEGATIVE /HPF
BILIRUB UR QL: NEGATIVE
COLOR UR: NORMAL
EPITH CASTS URNS QL MICRO: NORMAL /LPF
GLUCOSE UR STRIP.AUTO-MCNC: NEGATIVE MG/DL
HCG UR QL: NEGATIVE
HGB UR QL STRIP: NEGATIVE
HYALINE CASTS URNS QL MICRO: NORMAL /LPF (ref 0–5)
KETONES UR QL STRIP.AUTO: NEGATIVE MG/DL
LEUKOCYTE ESTERASE UR QL STRIP.AUTO: NEGATIVE
NITRITE UR QL STRIP.AUTO: NEGATIVE
PH UR STRIP: 7 [PH] (ref 5–8)
PROT UR STRIP-MCNC: NEGATIVE MG/DL
RBC #/AREA URNS HPF: NORMAL /HPF (ref 0–5)
SP GR UR REFRACTOMETRY: 1.01 (ref 1–1.03)
UR CULT HOLD, URHOLD: NORMAL
UROBILINOGEN UR QL STRIP.AUTO: 0.2 EU/DL (ref 0.2–1)
WBC URNS QL MICRO: NORMAL /HPF (ref 0–4)

## 2019-12-30 PROCEDURE — 81025 URINE PREGNANCY TEST: CPT

## 2019-12-30 PROCEDURE — 81001 URINALYSIS AUTO W/SCOPE: CPT

## 2019-12-30 PROCEDURE — 99283 EMERGENCY DEPT VISIT LOW MDM: CPT

## 2019-12-30 RX ORDER — METHYLPREDNISOLONE 4 MG/1
TABLET ORAL
Qty: 1 DOSE PACK | Refills: 0 | Status: SHIPPED | OUTPATIENT
Start: 2019-12-30 | End: 2020-02-13

## 2019-12-30 RX ORDER — KETOROLAC TROMETHAMINE 10 MG/1
10 TABLET, FILM COATED ORAL
Qty: 12 TAB | Refills: 0 | Status: SHIPPED | OUTPATIENT
Start: 2019-12-30 | End: 2020-01-02

## 2019-12-30 RX ORDER — CYCLOBENZAPRINE HCL 5 MG
5 TABLET ORAL
Qty: 9 TAB | Refills: 0 | Status: SHIPPED | OUTPATIENT
Start: 2019-12-30 | End: 2020-01-02

## 2019-12-30 NOTE — ED TRIAGE NOTES
Triage Note: Patient is coming in with back pain x 10 days. Denies injury.  Patient states light burning with urination

## 2019-12-30 NOTE — DISCHARGE INSTRUCTIONS

## 2020-01-07 NOTE — ED PROVIDER NOTES
Patient is a 16-year-old female presenting to the emergency department with back pain. Patient states that she has been having symptoms for approximately 10 days. Patient denies any injury denies any falls, trauma. Patient is also noticed that she has had a burning sensation with urination. Patient denies any fevers, chills, nausea, vomiting. Past Medical History:   Diagnosis Date    Breast disorder     Breast mass, right 8/2015       Past Surgical History:   Procedure Laterality Date    HX BREAST BIOPSY Right 8/11/2015    RIGHT BREAST EXCISIONAL BIOPSY W ULTRASOUND  performed by Beatriz Springer MD at [address] HX BREAST BIOPSY Right 2015    Surgical removal of benign tissue    HX BREAST REDUCTION Bilateral 2018    US GUIDED CORE BREAST BIOPSY Right 2015    benign         Family History:   Problem Relation Age of Onset    No Known Problems Mother     Diabetes Father     No Known Problems Son     No Known Problems Daughter     No Known Problems Daughter     No Known Problems Daughter     No Known Problems Daughter     Heart Attack Father        Social History     Socioeconomic History    Marital status:      Spouse name: Not on file    Number of children: Not on file    Years of education: Not on file    Highest education level: Not on file   Occupational History    Not on file   Social Needs    Financial resource strain: Not on file    Food insecurity:     Worry: Not on file     Inability: Not on file    Transportation needs:     Medical: Not on file     Non-medical: Not on file   Tobacco Use    Smoking status: Never Smoker    Smokeless tobacco: Never Used   Substance and Sexual Activity    Alcohol use: Never     Frequency: Never    Drug use: Never    Sexual activity: Yes     Partners: Male     Birth control/protection: Injection, I.U.D.    Lifestyle    Physical activity:     Days per week: Not on file     Minutes per session: Not on file    Stress: Not on file   Relationships    Social connections:     Talks on phone: Not on file     Gets together: Not on file     Attends Muslim service: Not on file     Active member of club or organization: Not on file     Attends meetings of clubs or organizations: Not on file     Relationship status: Not on file    Intimate partner violence:     Fear of current or ex partner: Not on file     Emotionally abused: Not on file     Physically abused: Not on file     Forced sexual activity: Not on file   Other Topics Concern    Not on file   Social History Narrative    ** Merged History Encounter **              ALLERGIES: Patient has no known allergies. Review of Systems   Genitourinary: Positive for dysuria. Musculoskeletal: Positive for back pain. All other systems reviewed and are negative. Vitals:    12/30/19 1716   BP: 120/76   Pulse: 76   Resp: 20   Temp: 98 °F (36.7 °C)   SpO2: 99%   Weight: 79.4 kg (175 lb)   Height: 5' 2\" (1.575 m)            Physical Exam  Vitals signs and nursing note reviewed. Constitutional:       General: She is not in acute distress. Appearance: She is well-developed. She is not diaphoretic. HENT:      Head: Normocephalic and atraumatic. Nose: Nose normal.      Mouth/Throat:      Pharynx: No oropharyngeal exudate. Eyes:      General: No scleral icterus. Right eye: No discharge. Left eye: No discharge. Extraocular Movements: Extraocular movements intact. Conjunctiva/sclera: Conjunctivae normal.      Pupils: Pupils are equal, round, and reactive to light. Neck:      Musculoskeletal: Normal range of motion and neck supple. Thyroid: No thyromegaly. Vascular: No JVD. Trachea: No tracheal deviation. Cardiovascular:      Rate and Rhythm: Normal rate and regular rhythm. Heart sounds: Normal heart sounds. No murmur. No friction rub. No gallop. Pulmonary:      Effort: Pulmonary effort is normal. No respiratory distress. Breath sounds: Normal breath sounds. No stridor. No wheezing or rales. Chest:      Chest wall: No tenderness. Abdominal:      General: Bowel sounds are normal. There is no distension. Palpations: There is no mass. Tenderness: There is tenderness. There is right CVA tenderness and left CVA tenderness. There is no rebound. Musculoskeletal: Normal range of motion. General: No tenderness. Lymphadenopathy:      Cervical: No cervical adenopathy. Skin:     General: Skin is warm and dry. Coloration: Skin is not pale. Findings: No erythema or rash. Neurological:      General: No focal deficit present. Mental Status: She is alert and oriented to person, place, and time. Cranial Nerves: No cranial nerve deficit. Coordination: Coordination normal.   Psychiatric:         Mood and Affect: Mood normal.         Behavior: Behavior normal.         Thought Content:  Thought content normal.         Judgment: Judgment normal.          MDM  Number of Diagnoses or Management Options  Back strain, initial encounter:   Risk of Complications, Morbidity, and/or Mortality  Presenting problems: low  Diagnostic procedures: low  Management options: low    Patient Progress  Patient progress: stable         Procedures

## 2020-02-13 ENCOUNTER — HOSPITAL ENCOUNTER (OUTPATIENT)
Dept: GENERAL RADIOLOGY | Age: 35
Discharge: HOME | End: 2020-02-13
Payer: COMMERCIAL

## 2020-02-13 ENCOUNTER — OFFICE VISIT (OUTPATIENT)
Dept: FAMILY MEDICINE CLINIC | Age: 35
End: 2020-02-13

## 2020-02-13 VITALS
BODY MASS INDEX: 32.57 KG/M2 | WEIGHT: 177 LBS | DIASTOLIC BLOOD PRESSURE: 68 MMHG | TEMPERATURE: 98 F | OXYGEN SATURATION: 99 % | HEART RATE: 69 BPM | SYSTOLIC BLOOD PRESSURE: 105 MMHG | RESPIRATION RATE: 17 BRPM | HEIGHT: 62 IN

## 2020-02-13 DIAGNOSIS — M54.50 LUMBAR BACK PAIN: ICD-10-CM

## 2020-02-13 DIAGNOSIS — R14.0 ABDOMINAL BLOATING: ICD-10-CM

## 2020-02-13 DIAGNOSIS — E78.2 MIXED HYPERLIPIDEMIA: Primary | ICD-10-CM

## 2020-02-13 DIAGNOSIS — R73.03 PREDIABETES: ICD-10-CM

## 2020-02-13 DIAGNOSIS — R10.84 GENERALIZED ABDOMINAL PAIN: ICD-10-CM

## 2020-02-13 PROCEDURE — 74018 RADEX ABDOMEN 1 VIEW: CPT

## 2020-02-13 RX ORDER — CYCLOBENZAPRINE HCL 10 MG
10 TABLET ORAL
Qty: 15 TAB | Refills: 0 | Status: SHIPPED | OUTPATIENT
Start: 2020-02-13 | End: 2020-09-24

## 2020-02-13 NOTE — PROGRESS NOTES
Chief Complaint   Patient presents with    Complete Physical    Abdominal Pain    LOW BACK PAIN     1. Have you been to the ER, urgent care clinic since your last visit? Hospitalized since your last visit? No    2. Have you seen or consulted any other health care providers outside of the [address] [place] [address] since your last visit? Include any pap smears or colon screening.  No

## 2020-02-13 NOTE — PROGRESS NOTES
Assessment/ Plan:       Diagnoses and all orders for this visit:    1. Mixed hyperlipidemia  -     LIPID PANEL    2. Abdominal bloating  -     XR ABD (KUB); Future  Discussed a low gas diet. 3. Lumbar back pain  -     cyclobenzaprine (FLEXERIL) 10 mg tablet; Take 1 Tab by mouth nightly as needed for Muscle Spasm(s). She declines referral to physical therapy. Treatment as above. Recommend stretching and heat as well. 4. Prediabetes  -     HEMOGLOBIN A1C WITH EAG    5. Generalized abdominal pain  -     XR ABD (KUB); Future  Unclear etiology. KUB pending. Consider ob/gyn follow up if no improvement. Follow-up and Dispositions    · Return in about 6 months (around 8/13/2020) for Complete Physical.          Discussed expected course/resolution/complications of diagnosis in detail with patient.    Medication risks/benefits/costs/interactions/alternatives discussed with patient.    Pt was given after visit summary which includes diagnoses, current medications & vitals. Pt expressed understanding with the diagnosis and plan      HPI:   Jacinto Gunter is a 27 y.o. female who presents for 6 month check up. She is followed by an ob/gyn. She is up to date on pap smear. Reports a several month history of lumbar back pain. This is her first evaluation here. She was previously seen at [facility] ED and given pain medication for back strain. Symptoms are worse with activity and improved with rest.     There is a history of abdominal bloating. There is a history of elevated cholesterol. There is a history of prediabetes. Current Outpatient Medications   Medication Sig Dispense Refill    cyclobenzaprine (FLEXERIL) 10 mg tablet Take 1 Tab by mouth nightly as needed for Muscle Spasm(s). 15 Tab 0    ondansetron (ZOFRAN ODT) 4 mg disintegrating tablet Take 1 Tab by mouth every eight (8) hours as needed for Nausea.  12 Tab 0      No Known Allergies   Patient Active Problem List   Diagnosis Code  Fibroadenoma of right breast D24.1    Mastodynia N64.4     Past Medical History:   Diagnosis Date    Breast disorder     Breast mass, right 8/2015      Past Surgical History:   Procedure Laterality Date    HX BREAST BIOPSY Right 8/11/2015    RIGHT BREAST EXCISIONAL BIOPSY W ULTRASOUND  performed by Elizabet Marr MD at [address] HX BREAST BIOPSY Right 2015    Surgical removal of benign tissue    HX BREAST REDUCTION Bilateral 2018    US GUIDED CORE BREAST BIOPSY Right 2015    benign      Patient's last menstrual period was 02/06/2020 (approximate). Family History   Problem Relation Age of Onset    No Known Problems Mother     Diabetes Father     Heart Attack Father     No Known Problems Son     No Known Problems Daughter     No Known Problems Daughter     No Known Problems Daughter     No Known Problems Daughter       Social History     Socioeconomic History    Marital status:      Spouse name: Not on file    Number of children: Not on file    Years of education: Not on file    Highest education level: Not on file   Occupational History    Not on file   Social Needs    Financial resource strain: Not on file    Food insecurity:     Worry: Not on file     Inability: Not on file    Transportation needs:     Medical: Not on file     Non-medical: Not on file   Tobacco Use    Smoking status: Never Smoker    Smokeless tobacco: Never Used   Substance and Sexual Activity    Alcohol use: Never     Frequency: Never    Drug use: Never    Sexual activity: Yes     Partners: Male     Birth control/protection: Injection, I.U.D.      Comment:    Lifestyle    Physical activity:     Days per week: Not on file     Minutes per session: Not on file    Stress: Not on file   Relationships    Social connections:     Talks on phone: Not on file     Gets together: Not on file     Attends Alevism service: Not on file     Active member of club or organization: Not on file Attends meetings of clubs or organizations: Not on file     Relationship status: Not on file    Intimate partner violence:     Fear of current or ex partner: Not on file     Emotionally abused: Not on file     Physically abused: Not on file     Forced sexual activity: Not on file   Other Topics Concern    Not on file   Social History Narrative    ** Merged History Encounter **             ROS:     Review of Systems   Constitutional: Negative for malaise/fatigue. Eyes: Negative for blurred vision. Respiratory: Negative for shortness of breath. Cardiovascular: Negative for chest pain. Gastrointestinal: Positive for abdominal pain. Musculoskeletal: Positive for back pain. Physical Exam:     Visit Vitals  /68   Pulse 69   Temp 98 °F (36.7 °C) (Oral)   Resp 17   Ht 5' 2\" (1.575 m)   Wt 177 lb (80.3 kg)   LMP 02/06/2020 (Approximate)   SpO2 99%   BMI 32.37 kg/m²        Nursing Documentation and Vital Signs Reviewed. Physical Exam  Constitutional:       General: She is not in acute distress. Appearance: She is obese. HENT:      Right Ear: No middle ear effusion. Tympanic membrane is not erythematous or bulging. Left Ear:  No middle ear effusion. Tympanic membrane is not erythematous or bulging. Nose: No rhinorrhea. Right Sinus: No maxillary sinus tenderness or frontal sinus tenderness. Left Sinus: No maxillary sinus tenderness or frontal sinus tenderness. Mouth/Throat:      Pharynx: No oropharyngeal exudate or posterior oropharyngeal erythema. Eyes:      General: Lids are normal.   Cardiovascular:      Heart sounds: S1 normal and S2 normal. No murmur. No friction rub. No gallop. Pulmonary:      Breath sounds: Normal breath sounds. No wheezing. Abdominal:      General: Abdomen is flat. Bowel sounds are normal.      Palpations: Abdomen is soft. Tenderness:  There is abdominal tenderness in the right lower quadrant, periumbilical area, suprapubic area and left lower quadrant. Lymphadenopathy:      Cervical: No cervical adenopathy. Skin:     General: Skin is warm and dry.    Psychiatric:         Mood and Affect: Mood and affect normal.

## 2020-02-14 LAB
CHOLEST SERPL-MCNC: 252 MG/DL (ref 100–199)
EST. AVERAGE GLUCOSE BLD GHB EST-MCNC: 108 MG/DL
HBA1C MFR BLD: 5.4 % (ref 4.8–5.6)
HDLC SERPL-MCNC: 62 MG/DL
INTERPRETATION, 910389: NORMAL
LDLC SERPL CALC-MCNC: 179 MG/DL (ref 0–99)
TRIGL SERPL-MCNC: 54 MG/DL (ref 0–149)
VLDLC SERPL CALC-MCNC: 11 MG/DL (ref 5–40)

## 2020-02-17 NOTE — PROGRESS NOTES
Outbound call to patient at [phone] no answer left a message for a return call to discuss lab results.

## 2020-02-17 NOTE — PROGRESS NOTES
Outbound call to patient at [phone] verified  informed her of lab results and recommendations. Patient will follow up in 3 months.

## 2020-06-25 ENCOUNTER — OFFICE VISIT (OUTPATIENT)
Dept: FAMILY MEDICINE CLINIC | Age: 35
End: 2020-06-25

## 2020-06-25 VITALS
OXYGEN SATURATION: 98 % | BODY MASS INDEX: 34.34 KG/M2 | SYSTOLIC BLOOD PRESSURE: 105 MMHG | HEART RATE: 81 BPM | WEIGHT: 186.6 LBS | RESPIRATION RATE: 16 BRPM | DIASTOLIC BLOOD PRESSURE: 77 MMHG | TEMPERATURE: 96.9 F | HEIGHT: 62 IN

## 2020-06-25 DIAGNOSIS — G89.29 CHRONIC SI JOINT PAIN: ICD-10-CM

## 2020-06-25 DIAGNOSIS — E78.49 OTHER HYPERLIPIDEMIA: ICD-10-CM

## 2020-06-25 DIAGNOSIS — M53.3 COCCYDYNIA: ICD-10-CM

## 2020-06-25 DIAGNOSIS — E66.9 OBESITY (BMI 30-39.9): Primary | ICD-10-CM

## 2020-06-25 DIAGNOSIS — E55.9 VITAMIN D DEFICIENCY: ICD-10-CM

## 2020-06-25 DIAGNOSIS — M53.3 CHRONIC SI JOINT PAIN: ICD-10-CM

## 2020-06-25 RX ORDER — NAPROXEN 500 MG/1
500 TABLET ORAL 2 TIMES DAILY WITH MEALS
Qty: 30 TAB | Refills: 1 | Status: SHIPPED | OUTPATIENT
Start: 2020-06-25 | End: 2020-09-24

## 2020-06-25 NOTE — PROGRESS NOTES
Chief Complaint   Patient presents with    Tailbone Pain     chronic, \"feels like something is breaking when i bend down\"     1. Have you been to the ER, urgent care clinic since your last visit? Hospitalized since your last visit? No    2. Have you seen or consulted any other health care providers outside of the [address] [place] [address] since your last visit? Include any pap smears or colon screening. No        Patient presents in office for routine follow up. C/o lower back pain 6/10. Unable to describe pain but states when she bends over it feels like something is breaking.

## 2020-06-25 NOTE — PROGRESS NOTES
[facility] Note    Tiara Bianchi is a 27 y.o. female who was seen in clinic today (6/25/2020). Subjective:  Cardiovascular Review:  The patient has hyperlipidemia and obesity. Diet and Lifestyle: generally follows a low fat low cholesterol diet, generally follows a low sodium diet, exercises sporadically, nonsmoker  Home BP Monitoring: is not measured at home. Pertinent ROS: no TIA's, no chest pain on exertion, no dyspnea on exertion, no swelling of ankles. Back Pain  Patient presents for evaluation of tailbone pain. Symptoms have been present for several weeks and include pain in tailbone (aching in character; 6/10 in severity). Initial inciting event: none. Alleviating factors identifiable by patient are recumbency. Exacerbating factors identifiable by patient are sitting, walking. Treatments so far initiated by patient: Tylenol and Ibuprofen. Previous lower back problems: none. Previous workup: none. Prior to Admission medications    Medication Sig Start Date End Date Taking? Authorizing Provider   naproxen (NAPROSYN) 500 mg tablet Take 1 Tab by mouth two (2) times daily (with meals). 6/25/20  Yes Rene Ang NP   cyclobenzaprine (FLEXERIL) 10 mg tablet Take 1 Tab by mouth nightly as needed for Muscle Spasm(s). 2/13/20   Mikayla Hi NP   ondansetron (ZOFRAN ODT) 4 mg disintegrating tablet Take 1 Tab by mouth every eight (8) hours as needed for Nausea. 7/29/19   Marilee Fair NP          No Known Allergies        ROS  See HPI    Objective:   Physical Exam  Vitals signs and nursing note reviewed. Constitutional:       Appearance: She is well-developed. Cardiovascular:      Rate and Rhythm: Normal rate and regular rhythm. Heart sounds: No murmur. No friction rub. No gallop. Pulmonary:      Effort: Pulmonary effort is normal. No respiratory distress. Breath sounds: Normal breath sounds.    Musculoskeletal:      Lumbar back: She exhibits tenderness and bony tenderness (SI joints and coccyx). Neurological:      Mental Status: She is alert and oriented to person, place, and time. Psychiatric:         Behavior: Behavior normal.         Thought Content: Thought content normal.         Judgment: Judgment normal.           Visit Vitals  /77 (BP 1 Location: Right arm, BP Patient Position: Sitting)   Pulse 81   Temp 96.9 °F (36.1 °C) (Oral)   Resp 16   Ht 5' 2\" (1.575 m)   Wt 186 lb 9.6 oz (84.6 kg)   LMP 06/10/2020   SpO2 98%   BMI 34.13 kg/m²       Assessment & Plan:  Diagnoses and all orders for this visit:    1. Obesity (BMI 30-39. 9)  Discussed need for weight loss through diet and exercise. Reviewed decreased caloric intake and increased activity. 2. Other hyperlipidemia  Recheck fasting labs. No other CVD risk factors, reviewed diet and lifestyle changes.  -     LIPID PANEL    3. Vitamin D deficiency  -     VITAMIN D, 25 HYDROXY    4. Coccydynia  X-ray to evaluate further. NSAIDs PRN. Request physical therapy evaluation and treatment.   -     naproxen (NAPROSYN) 500 mg tablet; Take 1 Tab by mouth two (2) times daily (with meals). -     XR SACRUM AND COCCYX; Future  -     REFERRAL TO PHYSICAL THERAPY    5. Chronic SI joint pain  X-ray to evaluate further. NSAIDs PRN. Request physical therapy evaluation and treatment. -     XR SI JTS MAX 2 V; Future  -     REFERRAL TO PHYSICAL THERAPY        I have discussed the diagnosis with the patient and the intended plan as seen in the above orders. The patient has received an after-visit summary along with patient information handout. I have discussed medication side effects and warnings with the patient as well. Follow-up and Dispositions    · Return in about 6 months (around 12/25/2020) for cholesterol.            Annemarie Oreilly, DEANDRE

## 2020-06-26 LAB
25(OH)D3+25(OH)D2 SERPL-MCNC: 26.7 NG/ML (ref 30–100)
CHOLEST SERPL-MCNC: 238 MG/DL (ref 100–199)
HDLC SERPL-MCNC: 59 MG/DL
INTERPRETATION, 910389: NORMAL
LDLC SERPL CALC-MCNC: 163 MG/DL (ref 0–99)
TRIGL SERPL-MCNC: 80 MG/DL (ref 0–149)
VLDLC SERPL CALC-MCNC: 16 MG/DL (ref 5–40)

## 2020-07-01 ENCOUNTER — HOSPITAL ENCOUNTER (OUTPATIENT)
Dept: GENERAL RADIOLOGY | Age: 35
Discharge: HOME | End: 2020-07-01
Payer: COMMERCIAL

## 2020-07-01 DIAGNOSIS — M53.3 CHRONIC SI JOINT PAIN: ICD-10-CM

## 2020-07-01 DIAGNOSIS — G89.29 CHRONIC SI JOINT PAIN: ICD-10-CM

## 2020-07-01 DIAGNOSIS — S33.2XXA: Primary | ICD-10-CM

## 2020-07-01 DIAGNOSIS — M53.3 COCCYDYNIA: ICD-10-CM

## 2020-07-01 DIAGNOSIS — M46.1 SACROILIITIS (HCC): ICD-10-CM

## 2020-07-01 PROCEDURE — 72202 X-RAY EXAM SI JOINTS 3/> VWS: CPT

## 2020-07-01 PROCEDURE — 72220 X-RAY EXAM SACRUM TAILBONE: CPT

## 2020-07-01 NOTE — PROGRESS NOTES
Call placed to patient. Name and  verified. Reviewed recent test results with patient. Provided contact information for ortho.

## 2020-07-01 NOTE — PROGRESS NOTES
Patient posterior subluxation (misalignment) of the coccyx. No fracture seen. I will refer her to orthopedics for further evaluation.

## 2020-07-09 ENCOUNTER — HOSPITAL ENCOUNTER (OUTPATIENT)
Dept: PHYSICAL THERAPY | Age: 35
Discharge: HOME | End: 2020-07-09
Payer: COMMERCIAL

## 2020-07-09 PROCEDURE — 97161 PT EVAL LOW COMPLEX 20 MIN: CPT | Performed by: PHYSICAL THERAPIST

## 2020-07-09 NOTE — PROGRESS NOTES
[facility]  [address]  [place], [address] [facility]  Phone: [phone]  Fax: [phone]    Plan of Care/Statement of Necessity for Physical Therapy Services  2-15    Patient name: Marcel Hurley  : 1985  Provider#: [id number]  Referral source: Gonzalo Mcguire NP      Medical/Treatment Diagnosis: Chronic SI joint pain [M53.3, G89.29]     Prior Hospitalization: see medical history     Comorbidities: see evalaution  Prior Level of Function: see evaluation  Medications: Verified on Patient Summary List    Start of Care: 2020      Onset Date: chronic       The Plan of Care and following information is based on the information from the initial evaluation. Assessment/ key information: Pt is a 27year old female presenting with acute on chronic LBP. She will benefit from PT to address problem list below    Problem List: pain affecting function, decrease ROM, decrease strength, decrease ADL/ functional abilitiies, decrease activity tolerance and decrease flexibility/ joint mobility   Treatment Plan may include any combination of the following: Therapeutic exercise, Therapeutic activities, Neuromuscular re-education, Physical agent/modality, Gait/balance training, Manual therapy, Patient education, Self Care training and Functional mobility training  Patient / Family readiness to learn indicated by: asking questions, trying to perform skills and interest  Persons(s) to be included in education: patient (P)  Barriers to Learning/Limitations: yes;  language  Patient Goal (s): see evaluation  Patient Self Reported Health Status: good  Rehabilitation Potential: good    Short Term Goals: To be accomplished in 2-3 weeks:  1) Pt will be independent in initial HEP  2) Pt will report >/=25% improvement in symptoms  3) Pt will report compliance with ice regimen    Long Term Goals:  To be accomplished in 4-6 weeks:  1) Pt will report being able to stand for >/=20 min with </=3/10 LBP  2) Pt will report being able to lift objects at work with <./=3/10 LBP  3) Pt will report >/=50% improvement in symptoms    Frequency / Duration: Patient to be seen 1-2 times per week for 4-6 weeks. Patient/ Caregiver education and instruction: self care, activity modification and exercises    [x]  Plan of care has been reviewed with DENI Coy Monday, PT 7/9/2020   ________________________________________________________________________    I certify that the above Therapy Services are being furnished while the patient is under my care. I agree with the treatment plan and certify that this therapy is necessary.     [de-identified] Signature:____________________  Date:____________Time: _________

## 2020-07-09 NOTE — PROGRESS NOTES
[employer] [facility]  [address] [facility], [address]  Phone: [phone]  Fax: [phone]    PT INITIAL EVALUATION NOTE - [facility] 2-15    Patient Name: Sheng Stanton  Date:2020  : 1985  [x]  Patient  Verified   Payor: [address] [place] [address] Road / Plan: [address] / Product Type: Managed Care Medicaid /    In time: 159 A  Out time: 940 A  Total Treatment Time (min): 30  Total Timed Codes (min): 5  1:1 Treatment Time ( only): 30   Visit #: 1     Treatment Area: Chronic SI joint pain [M53.3, G89.29]    SUBJECTIVE    Any medication changes, allergies to medications, adverse drug reactions, diagnosis change, or new procedure performed?: [] No    [x] Yes (see summary sheet for update)    Current symptoms/chief complaint and ate of onset/injury:   Pt accompanied with daughter for translation. From [facility]  Pt presents with chief compliant of LBP, \"when it hurts I cannot move my legs\". Symptoms for >1 year, however it has recently worsened. She saw PCP, had x-rays, she does not know the results. Has appt with ortho MD at [facility] scheduled for the end of the month however does not know the MD's name. Aggravated by:  Standing, daily activities throughout the day, lifting objects  Eased by: sitting    Pain Level (0-10 scale): 8/10 max  6/10 min  8/10 today  Location of symptoms:   LBP, R>L  PMH: Significant for breast lump removed approx 2017  Social/Recreation/Work: 35 hrs/wk at [employer]. No regular exercise routine- \"I don't like exercise\"  Prior level of function: acute on chronic LBP  Patient goal(s): \"for my back to not hurt\"     Objective:      Results of x-ray per CC:  IMPRESSION:   1. Posterior subluxation of the coccyx represents congenital variant versus injury. No fracture. 2. Possible subtle sacroiliitis in the proper clinical setting.     Posture:   Fair sitting, standing posture    Gait:   WNL    Lumbar Active Movements:  ROM  AROM Comments:pain, area   Forward flexion  Fingers to mid tibia No change   Extension  WNL No change   SB right Fingers mid patella No change   SB left Fingers mid patella Inc'd symptoms     Strength:      R (0-5) L (0-5)   Hip Flexion (L1,2) 4+ 4+   Knee Extension (L3,4) 5 5   Knee Flexion (S1,2) 5 5   Ankle Dorsiflexion (L4) 5 5   Sidelying hip abduction 4 4   Pt able to perform supine bridge  Pt able to perform supine SLR R and L. Sligh inc'd symptoms in R side of lower back with R SLR    Palpation:     Mod to severe TTP sacrum, L3-L5 spinous processes, R>L lumbar paraspinals    Special Tests:    Dural Mobility:  SLR Supine: [] R    [] L    [] +    [x] -    Crossed SLR  [] R    [] L    [] +    [x] -    Slump Test: [] R    [] L    [] +    [x] -      Flexibility Deficits:  HS length fair bilat         Outcome Measure: Patient presents with a FOTO score of NT.      5 min Therapeutic Exercise:  [x] See flow sheet : instructed in HEP   Rationale: increase ROM and increase strength to improve the patients ability to perform daily activities, lifting with dec'd symptoms            With   [x] TE   [] TA   [] neuro   [] other: Patient Education: [x] Review HEP    [] Progressed/Changed HEP based on:   [] positioning   [] body mechanics   [] transfers   [x] heat/ice application    [x] other: livia/phys; PT POC; importance of performing HEP in order to maximize benefit of PT; use of ice for 10-15 min 1x/day in order to manage symptoms; encouraged to use rest breaks as able at work      Pain Level (0-10 scale) post treatment: not reported    Assessment:   [x] See POC  [] Other:  Plan:   [x] See POC  [] Other  [] Discharge due to:     Denice Pedersen PT, DPT     7/9/2020     9:08 AM

## 2020-07-16 ENCOUNTER — APPOINTMENT (OUTPATIENT)
Dept: PHYSICAL THERAPY | Age: 35
End: 2020-07-16
Payer: COMMERCIAL

## 2020-07-23 ENCOUNTER — HOSPITAL ENCOUNTER (OUTPATIENT)
Dept: PHYSICAL THERAPY | Age: 35
Discharge: HOME | End: 2020-07-23
Payer: COMMERCIAL

## 2020-07-23 PROCEDURE — 97110 THERAPEUTIC EXERCISES: CPT | Performed by: PHYSICAL THERAPY ASSISTANT

## 2020-07-23 NOTE — PROGRESS NOTES
PT DAILY TREATMENT NOTE 2-15    Patient Name: Sheng Stanton  Date:2020  : 1985  [x]  Patient  Verified  Payor: [address] [place] [address] Road / Plan: [address] / Product Type: Managed Care Medicaid /    In time:8:55 AM  Out time:9:45 AM  Total Treatment Time (min): 50  Visit #:  2    Treatment Area: Chronic SI joint pain [M53.3, G89.29]    SUBJECTIVE  Pain Level (0-10 scale): 8/10  Any medication changes, allergies to medications, adverse drug reactions, diagnosis change, or new procedure performed?: [x] No    [] Yes (see summary sheet for update)  Subjective functional status/changes:   [] No changes reported  Patient reports she has not performed her HEP since her first visit on 20 due to her \"not having time. \"    OBJECTIVE     Modality rationale: decrease inflammation and decrease pain to improve the patients ability to stand, lift, perform ADL's without pain/limitation   Min Type Additional Details       [] Estim: []Att   []Unatt    []TENS instruct                  []IFC  []Premod   []NMES                     []Other:  []w/US   []w/ice   []w/heat  Position:  Location:       []  Traction: [] Cervical       []Lumbar                       [] Prone          []Supine                       []Intermittent   []Continuous Lbs:  [] before manual  [] after manual  []w/heat    []  Ultrasound: []Continuous   [] Pulsed                       at: []1MHz   []3MHz Location:  W/cm2:    [] Paraffin         Location:   []w/heat   10 [x]  Ice     []  Heat  []  Ice massage Position: supine with LE's elevated  Location: lumbar spine    []  Laser  []  Other: Position:  Location:      []  Vasopneumatic Device Pressure:       [] lo [] med [] hi   Temperature:      [x] Skin assessment post-treatment:  [x]intact []redness- no adverse reaction    []redness - adverse reaction:         40 min Therapeutic Exercise:  [x] See flow sheet : progressed per flow sheet   Rationale: increase ROM, increase strength and improve coordination to improve the patients ability to walk, lift, perform ADL's without pain/limitation            With   [x] TE   [] TA   [] neuro   [] other: Patient Education: [x] Review HEP    [] Progressed/Changed HEP based on:   [x] positioning   [] body mechanics   [] transfers   [x] heat/ice application    [x] other: reviewed postural principles; use of pillow between knees while sleeping, sitting with pillow to support lumbar spine, log roll technique with bed mobility/transfers, correct lifting mechanics;keeping object close to body, avoiding lumbar rotation, maintaining TrA brace. Other Objective/Functional Measures: NT     Pain Level (0-10 scale) post treatment: 7/10    ASSESSMENT/Changes in Function:   Reiterated importance of compliance with HEP for maximum benefit from PT. Reviewed postural principles and gave patient handout regarding this. Overall tolerated exercises well. Patient will continue to benefit from skilled PT services to modify and progress therapeutic interventions, address functional mobility deficits, address ROM deficits, address strength deficits, analyze and cue movement patterns, analyze and modify body mechanics/ergonomics and instruct in home and community integration to attain remaining goals.      []  See Plan of Care  []  See progress note/recertification  []  See Discharge Summary         Progress towards goals / Updated goals:  NT    PLAN  [x]  Upgrade activities as tolerated     [x]  Continue plan of care  []  Update interventions per flow sheet       []  Discharge due to:_  []  Other:_      Jb Sharma, PTA 7/23/2020

## 2020-07-30 ENCOUNTER — HOSPITAL ENCOUNTER (OUTPATIENT)
Dept: PHYSICAL THERAPY | Age: 35
Discharge: HOME | End: 2020-07-30
Payer: COMMERCIAL

## 2020-07-30 PROCEDURE — 97110 THERAPEUTIC EXERCISES: CPT | Performed by: PHYSICAL THERAPY ASSISTANT

## 2020-07-30 NOTE — PROGRESS NOTES
PT DAILY TREATMENT NOTE 2-15    Patient Name: David Kim  Date:2020  : 1985  [x]  Patient  Verified  [facility] [address] / Plan: [address] / Product Type: Managed Care Medicaid /    In time:8:50 AM  Out time:9:40 AM  Total Treatment Time (min): 50  Visit #:  3    Treatment Area: Chronic SI joint pain [M53.3, G89.29]    SUBJECTIVE  Pain Level (0-10 scale): 5/10  Any medication changes, allergies to medications, adverse drug reactions, diagnosis change, or new procedure performed?: [x] No    [] Yes (see summary sheet for update)  Subjective functional status/changes:   [] No changes reported  Patient reports she is feeling better, she has been doing her exercises at home.     OBJECTIVE     Modality rationale: decrease inflammation and decrease pain to improve the patients ability to stand, lift, perform ADL's without pain/limitation   Min Type Additional Details       [] Estim: []Att   []Unatt    []TENS instruct                  []IFC  []Premod   []NMES                     []Other:  []w/US   []w/ice   []w/heat  Position:  Location:       []  Traction: [] Cervical       []Lumbar                       [] Prone          []Supine                       []Intermittent   []Continuous Lbs:  [] before manual  [] after manual  []w/heat    []  Ultrasound: []Continuous   [] Pulsed                       at: []1MHz   []3MHz Location:  W/cm2:    [] Paraffin         Location:   []w/heat   10 [x]  Ice     []  Heat  []  Ice massage Position: supine with LE's elevated  Location: lumbar spine    []  Laser  []  Other: Position:  Location:      []  Vasopneumatic Device Pressure:       [] lo [] med [] hi   Temperature:      [x] Skin assessment post-treatment:  [x]intact []redness- no adverse reaction    []redness  adverse reaction:         40 min Therapeutic Exercise:  [x] See flow sheet : progressed per flow sheet   Rationale: increase ROM, increase strength and improve coordination to improve the patients ability to walk, lift, perform ADL's without pain/limitation            With   [x] TE   [] TA   [] neuro   [] other: Patient Education: [x] Review HEP    [] Progressed/Changed HEP based on:   [x] positioning   [] body mechanics   [] transfers   [x] heat/ice application    [] other:     Other Objective/Functional Measures: NT     Pain Level (0-10 scale) post treatment: 4/10    ASSESSMENT/Changes in Function:   Tolerated exercises well. Patient will continue to benefit from skilled PT services to modify and progress therapeutic interventions, address functional mobility deficits, address ROM deficits, address strength deficits, analyze and cue movement patterns, analyze and modify body mechanics/ergonomics and instruct in home and community integration to attain remaining goals.      []  See Plan of Care  []  See progress note/recertification  []  See Discharge Summary         Progress towards goals / Updated goals:  NT    PLAN  [x]  Upgrade activities as tolerated     [x]  Continue plan of care  []  Update interventions per flow sheet       []  Discharge due to:_  []  Other:_      Lui Desai, PTA 7/30/2020

## 2020-08-06 ENCOUNTER — HOSPITAL ENCOUNTER (OUTPATIENT)
Dept: PHYSICAL THERAPY | Age: 35
Discharge: HOME | End: 2020-08-06
Payer: COMMERCIAL

## 2020-08-06 PROCEDURE — 97140 MANUAL THERAPY 1/> REGIONS: CPT | Performed by: PHYSICAL THERAPY ASSISTANT

## 2020-08-06 PROCEDURE — 97110 THERAPEUTIC EXERCISES: CPT | Performed by: PHYSICAL THERAPY ASSISTANT

## 2020-08-06 NOTE — PROGRESS NOTES
PT DAILY TREATMENT NOTE 2-15    Patient Name: Trinity Alburnett  Date:2020  : 1985  [x]  Patient  Verified  Payor: [address] [place] [address] Road / Plan: [address] / Product Type: Managed Care Medicaid /    In time:8:30 AM  Out time:10:05 AM  Total Treatment Time (min): 65  Timed : 55 min  Visit #:  4    Treatment Area: Chronic SI joint pain [M53.3, G89.29]    SUBJECTIVE  Pain Level (0-10 scale): 7-8/10  Any medication changes, allergies to medications, adverse drug reactions, diagnosis change, or new procedure performed?: [x] No    [] Yes (see summary sheet for update)  Subjective functional status/changes:   [] No changes reported  Patient reports \"This week has not been a good week. \" states higher pain levels but unable to attribute to anything specific.     OBJECTIVE     Modality rationale: decrease inflammation and decrease pain to improve the patients ability to stand, lift, perform ADL's without pain/limitation   Min Type Additional Details       [] Estim: []Att   []Unatt    []TENS instruct                  []IFC  []Premod   []NMES                     []Other:  []w/US   []w/ice   []w/heat  Position:  Location:       []  Traction: [] Cervical       []Lumbar                       [] Prone          []Supine                       []Intermittent   []Continuous Lbs:  [] before manual  [] after manual  []w/heat    []  Ultrasound: []Continuous   [] Pulsed                       at: []1MHz   []3MHz Location:  W/cm2:    [] Paraffin         Location:   []w/heat   10 [x]  Ice     []  Heat  []  Ice massage Position: supine with LE's elevated  Location: lumbar spine    []  Laser  []  Other: Position:  Location:      []  Vasopneumatic Device Pressure:       [] lo [] med [] hi   Temperature:      [x] Skin assessment post-treatment:  [x]intact []redness- no adverse reaction    []redness  adverse reaction:         45 min Therapeutic Exercise:  [x] See flow sheet : progressed per flow sheet   Rationale: increase ROM, increase strength and improve coordination to improve the patients ability to walk, lift, perform ADL's without pain/limitation       10 min Manual Therapy: STM/MFR B lumbar paraspinals, gluts in prone with pillow    Rationale: increase ROM,decrease pain, improve soft tissue mobility  to improve the patients ability to walk, lift, perform ADL's without pain/limitation            With   [x] TE   [] TA   [] neuro   [] other: Patient Education: [x] Review HEP    [] Progressed/Changed HEP based on:   [x] positioning   [] body mechanics   [] transfers   [x] heat/ice application    [] other:     Other Objective/Functional Measures: TTP B lumbar paraspinals, gluts     Pain Level (0-10 scale) post treatment: 6/10    ASSESSMENT/Changes in Function:   Cues to maintain neutral lumbar spine during standing TrA TB shoulder series. Decreased pain with treatment. Patient will continue to benefit from skilled PT services to modify and progress therapeutic interventions, address functional mobility deficits, address ROM deficits, address strength deficits, analyze and cue movement patterns, analyze and modify body mechanics/ergonomics and instruct in home and community integration to attain remaining goals.      []  See Plan of Care  []  See progress note/recertification  []  See Discharge Summary         Progress towards goals / Updated goals:  NT    PLAN  [x]  Upgrade activities as tolerated     [x]  Continue plan of care  []  Update interventions per flow sheet       []  Discharge due to:_  []  Other:_      Minus Adonis, PTA 8/6/2020

## 2020-08-13 ENCOUNTER — APPOINTMENT (OUTPATIENT)
Dept: PHYSICAL THERAPY | Age: 35
End: 2020-08-13
Payer: COMMERCIAL

## 2020-08-20 ENCOUNTER — HOSPITAL ENCOUNTER (OUTPATIENT)
Dept: PHYSICAL THERAPY | Age: 35
Discharge: HOME | End: 2020-08-20
Payer: COMMERCIAL

## 2020-08-20 PROCEDURE — 97110 THERAPEUTIC EXERCISES: CPT | Performed by: PHYSICAL THERAPY ASSISTANT

## 2020-08-20 NOTE — PROGRESS NOTES
PROGRESS NOTE 2-15    Patient Name: Jarek Ramesh  Date:2020  : 1985  [x]  Patient  Verified  Payor: [address] [place] [address] Road / Plan: [address] / Product Type: Managed Care Medicaid /    In time:9:15 AM  Out time:10:10 AM  Total Treatment Time (min): 55  Timed : 45 min  Visit #:  5    Treatment Area: Chronic SI joint pain [M53.3, G89.29]    SUBJECTIVE  Pain Level (0-10 scale): 6-7/10 currently, worst 6-7/10, best 6-7/10  Any medication changes, allergies to medications, adverse drug reactions, diagnosis change, or new procedure performed?: [x] No    [] Yes (see summary sheet for update)  Subjective functional status/changes:   [] No changes reported  Patient reports 70% improvement in symptoms since start of treatment. Able to walk 30-45 min, standing 2 hours before having increased pain, no pain with prolonged sitting.      OBJECTIVE     Modality rationale: decrease inflammation and decrease pain to improve the patients ability to stand, lift, perform ADL's without pain/limitation   Min Type Additional Details       [] Estim: []Att   []Unatt    []TENS instruct                  []IFC  []Premod   []NMES                     []Other:  []w/US   []w/ice   []w/heat  Position:  Location:       []  Traction: [] Cervical       []Lumbar                       [] Prone          []Supine                       []Intermittent   []Continuous Lbs:  [] before manual  [] after manual  []w/heat    []  Ultrasound: []Continuous   [] Pulsed                       at: []1MHz   []3MHz Location:  W/cm2:    [] Paraffin         Location:   []w/heat   10 []  Ice     [x]  Heat  []  Ice massage Position: supine with LE's elevated  Location: lumbar spine    []  Laser  []  Other: Position:  Location:      []  Vasopneumatic Device Pressure:       [] lo [] med [] hi   Temperature:      [x] Skin assessment post-treatment:  [x]intact []redness- no adverse reaction    []redness  adverse reaction:         45 min Therapeutic Exercise:  [x] See flow sheet : REASSESSMENT PERFORMED   Rationale: increase ROM, increase strength and improve coordination to improve the patients ability to walk, lift, perform ADL's without pain/limitation        min Manual Therapy: STM/MFR B lumbar paraspinals, gluts in prone with pillow    Rationale: increase ROM,decrease pain, improve soft tissue mobility  to improve the patients ability to walk, lift, perform ADL's without pain/limitation            With   [x] TE   [] TA   [] neuro   [] other: Patient Education: [x] Review HEP    [] Progressed/Changed HEP based on:   [x] positioning   [] body mechanics   [] transfers   [x] heat/ice application    [] other:     Other Objective/Functional Measures:     Lumbar Active Movements:  ROM  AROM Comments:pain, area   Forward flexion  Fingers to toes No change   Extension  WNL No change   SB right WNL Increased symptoms   SB left WNL No Change      Strength:       R (0-5) L (0-5)   Hip Flexion (L1,2) 4+ 4+   Knee Extension (L3,4) 5 5   Knee Flexion (S1,2) 5 5   Ankle Dorsiflexion (L4) 5 5   Sidelying hip abduction 4 4   Pt able to perform supine bridge  Pt able to perform supine SLR R and L. Sligh inc'd symptoms in R side of lower back with R SLR     Palpation: Mod to severe TTP sacrum, L3-L5 spinous processes, R>L lumbar paraspinals       Pain Level (0-10 scale) post treatment: 6/10    ASSESSMENT/Changes in Function:   Patient has been seen for 5 skilled PT visits since 7/9/20, she demonstrates improved lumbar AROM and improved function however continues to have significant pain (6-7/10) with functional tasks. We have reviewed postural principles and gave her final HEP. Recommended patient schedule a f/u with MD secondary to continued high pain levels. []  See Plan of Care  []  See progress note/recertification  []  See Discharge Summary         Progress towards goals / Updated goals:  Short Term Goals:  To be accomplished in 2-3 weeks:  1) Pt will be independent in initial HEP Met  2) Pt will report >/=25% improvement in symptoms Met   3) Pt will report compliance with ice regimen Met     Long Term Goals:  To be accomplished in 4-6 weeks:  1) Pt will report being able to stand for >/=20 min with </=3/10 LBP Partially met (increased standing tolerance however limited by pain)  2) Pt will report being able to lift objects at work with <./=3/10 LBP Not Met  3) Pt will report >/=50% improvement in symptoms partially met (reports increased function but pain levels remain high)    PLAN  []  Upgrade activities as tolerated     []  Continue plan of care  []  Update interventions per flow sheet       []  Discharge due to:_  [x]  Other: hold PT until f/u with MD Maria Guadalupe Davis, PTA 8/20/2020

## 2020-08-20 NOTE — PROGRESS NOTES
[facility]   [address]  [place], [address]  Phone: [phone] Fax: [phone]    Progress Note    Name: Trev Mccollum   : 1985   MD: Kelley Hagen NP       Treatment Diagnosis: Chronic SI joint pain [M53.3, G89.29]  Start of Care: 20    Visits from Start of Care: 5  Missed Visits: 2    Summary of Care: Therapeutic exercise, Therapeutic activities, Neuromuscular re-education, Physical agent/modality, Gait/balance training, Manual therapy, Patient education, Self Care training and Functional mobility training    SUBJECTIVE  Pain Level (0-10 scale): 6-7/10 currently, worst 6-7/10, best 6-7/10  Any medication changes, allergies to medications, adverse drug reactions, diagnosis change, or new procedure performed?: [x]? No    []? Yes (see summary sheet for update)  Subjective functional status/changes:   []? No changes reported  Patient reports 70% improvement in symptoms since start of treatment. Able to walk 30-45 min, standing 2 hours before having increased pain, no pain with prolonged sitting.      OBJECTIVE      Forward flexion  Fingers to toes No change   Extension  WNL No change   SB right WNL Increased symptoms   SB left WNL No Change      Strength:       R (0-5) L (0-5)   Hip Flexion (L1,2) 4+ 4+   Knee Extension (L3,4) 5 5   Knee Flexion (S1,2) 5 5   Ankle Dorsiflexion (L4) 5 5   Sidelying hip abduction 4 4   Pt able to perform supine bridge  Pt able to perform supine SLR R and L. Sligh inc'd symptoms in R side of lower back with R SLR     Palpation:    Mod to severe TTP sacrum, L3-L5 spinous processes, R>L lumbar paraspinals                   Pain Level (0-10 scale) post treatment: 6/10     ASSESSMENT/Changes in Function:   Patient has been seen for 5 skilled PT visits since 20, she demonstrates improved lumbar AROM and improved function however continues to have significant pain (6-7/10) with functional tasks.   We have reviewed postural principles and gave her final HEP. Recommended patient schedule a f/u with MD secondary to continued high pain levels.         []? See Plan of Care  []? See progress note/recertification  []? See Discharge Summary         Progress towards goals / Updated goals:  Short Term Goals: To be accomplished in 2-3 weeks:  1) Pt will be independent in initial HEP Met  2) Pt will report >/=25% improvement in symptoms Met   3) Pt will report compliance with ice regimen Met     Long Term Goals: To be accomplished in 4-6 weeks:  1) Pt will report being able to stand for >/=20 min with </=3/10 LBP Partially met (increased standing tolerance however limited by pain)  2) Pt will report being able to lift objects at work with <./=3/10 LBP Not Met  3) Pt will report >/=50% improvement in symptoms partially met (reports increased function but pain levels remain high)     PLAN  []? Upgrade activities as tolerated     []? Continue plan of care  []? Update interventions per flow sheet       []? Discharge due to:_  [x]? Other: hold PT until f/u with MD Kendy Brito, PTA 8/20/2020     ________________________________________________________________________  NOTE TO PHYSICIAN:  Please complete the following and fax to: [facility]: [phone]  . Retain this original for your records. If you are unable to process this request in 24 hours, please contact our office.        ____ I have read the above report and request that my patient continue therapy with the following changes/special instructions:  ____ I have read the above report and request that my patient be discharged from therapy    Physician's Signature:_________________ Date:___________Time:__________

## 2020-08-27 ENCOUNTER — APPOINTMENT (OUTPATIENT)
Dept: PHYSICAL THERAPY | Age: 35
End: 2020-08-27
Payer: COMMERCIAL

## 2020-09-24 ENCOUNTER — OFFICE VISIT (OUTPATIENT)
Dept: FAMILY MEDICINE CLINIC | Age: 35
End: 2020-09-24
Payer: COMMERCIAL

## 2020-09-24 VITALS
RESPIRATION RATE: 16 BRPM | DIASTOLIC BLOOD PRESSURE: 64 MMHG | TEMPERATURE: 98 F | HEART RATE: 80 BPM | WEIGHT: 189 LBS | BODY MASS INDEX: 34.78 KG/M2 | HEIGHT: 62 IN | SYSTOLIC BLOOD PRESSURE: 109 MMHG | OXYGEN SATURATION: 99 %

## 2020-09-24 DIAGNOSIS — G44.211 INTRACTABLE EPISODIC TENSION-TYPE HEADACHE: Primary | ICD-10-CM

## 2020-09-24 PROCEDURE — 99213 OFFICE O/P EST LOW 20 MIN: CPT | Performed by: NURSE PRACTITIONER

## 2020-09-24 PROCEDURE — 96372 THER/PROPH/DIAG INJ SC/IM: CPT | Performed by: NURSE PRACTITIONER

## 2020-09-24 RX ORDER — KETOROLAC TROMETHAMINE 30 MG/ML
30 INJECTION, SOLUTION INTRAMUSCULAR; INTRAVENOUS ONCE
Qty: 1 VIAL | Refills: 0
Start: 2020-09-24 | End: 2020-09-24

## 2020-09-24 RX ORDER — IBUPROFEN 200 MG
TABLET ORAL
COMMUNITY
End: 2020-11-12 | Stop reason: ALTCHOICE

## 2020-09-24 NOTE — PROGRESS NOTES
Chief Complaint   Patient presents with    Headache     over 1 month pt has had headache in the front area of the head     1. Have you been to the ER, urgent care clinic since your last visit? Hospitalized since your last visit? No     2. Have you seen or consulted any other health care providers outside of the [address] [place] [address] since your last visit? Include any pap smears or colon screening.  No

## 2020-09-24 NOTE — PROGRESS NOTES
Assessment/Plan:     Diagnoses and all orders for this visit:    1. Intractable episodic tension-type headache  -     ketorolac (TORADOL) 30 mg/mL (1 mL) injection; 1 mL by IntraMUSCular route once for 1 dose. -     KETOROLAC TROMETHAMINE INJ  -     IL THER/PROPH/DIAG INJECTION, SUBCUT/IM    Treatment as above. Rebound headache is likely from overuse of otc NSAIDS. Discontinue at this time. Follow up if symptoms do not improve. Discussed expected course/resolution/complications of diagnosis in detail with patient. Medication risks/benefits/costs/interactions/alternatives discussed with patient. Pt was given after visit summary which includes diagnoses, current medications & vitals. Pt expressed understanding with the diagnosis and plan          Subjective:      Ramez Cortes is a 27 y.o. female who presents for had concerns including Headache (over 1 month pt has had headache in the front area of the head). Reports a frontal headache for one month. Reports headache as intractable. Symptoms began suddenly. Has attempted NSAIDS daily without improvement. Reports photosensitivity. Denies nausea, vomiting, visual changes. Denies a history of similar symptoms. This is her first evaluation. Patient Active Problem List   Diagnosis Code    Fibroadenoma of right breast D24.1    Mastodynia N64.4    Other hyperlipidemia E78.49       Current Outpatient Medications   Medication Sig Dispense Refill    ibuprofen (MOTRIN) 200 mg tablet Take  by mouth. No Known Allergies    ROS:   Review of Systems   Constitutional: Negative for malaise/fatigue. Eyes: Negative for blurred vision. Respiratory: Negative for shortness of breath. Cardiovascular: Negative for chest pain. Neurological: Positive for headaches.        Objective:     Visit Vitals  /64 (BP 1 Location: Left arm, BP Patient Position: Sitting)   Pulse 80   Temp 98 °F (36.7 °C) (Temporal)   Resp 16   Ht 5' 2\" (1.575 m) Wt 189 lb (85.7 kg)   LMP 09/17/2020 (Approximate)   SpO2 99%   BMI 34.57 kg/m²       Vitals and Nurse Documentation reviewed. Physical Exam  Constitutional:       General: She is not in acute distress. Cardiovascular:      Heart sounds: S1 normal and S2 normal. No murmur. No friction rub. No gallop. Pulmonary:      Effort: No respiratory distress. Breath sounds: Normal breath sounds. Skin:     General: Skin is warm and dry. Neurological:      Cranial Nerves: No cranial nerve deficit or facial asymmetry. Sensory: No sensory deficit. Motor: No weakness or pronator drift. Gait: Gait is intact. Deep Tendon Reflexes:      Reflex Scores:       Patellar reflexes are 2+ on the right side and 2+ on the left side.   Psychiatric:         Mood and Affect: Mood and affect normal.

## 2020-09-24 NOTE — LETTER
NOTIFICATION RETURN TO WORK / SCHOOL 
 
9/24/2020 10:09 AM 
 
Ms. Gwenlyn Canavan [address] [place] [address] To Whom It May Concern: 
 
Gwenlyn Canavan is currently under the care of KWESI Andrade. She will return to work/school on: 9/28/20 If there are questions or concerns please have the patient contact our office. Sincerely, Chelsea Dickinson NP

## 2020-10-14 ENCOUNTER — TELEPHONE (OUTPATIENT)
Dept: FAMILY MEDICINE CLINIC | Age: 35
End: 2020-10-14

## 2020-10-14 NOTE — TELEPHONE ENCOUNTER
Via fax [employer] requested updated dx codes for [facility] 06/25/20 [facility]#[id number]. Codes not coded to highest level for patient's condition. Ronna Guo forward to NIKO Moss for review. Rec updated dx code E55.9 back from NIKO Calvin for [facility] 06/25/2020 inv [id number]. Ronna Guo .faxed to [employer] @ [phone] for medical billing.

## 2020-11-12 ENCOUNTER — OFFICE VISIT (OUTPATIENT)
Dept: FAMILY MEDICINE CLINIC | Age: 35
End: 2020-11-12
Payer: COMMERCIAL

## 2020-11-12 VITALS
BODY MASS INDEX: 34.82 KG/M2 | HEIGHT: 62 IN | TEMPERATURE: 98 F | SYSTOLIC BLOOD PRESSURE: 114 MMHG | DIASTOLIC BLOOD PRESSURE: 75 MMHG | HEART RATE: 79 BPM | RESPIRATION RATE: 16 BRPM | OXYGEN SATURATION: 97 % | WEIGHT: 189.2 LBS

## 2020-11-12 DIAGNOSIS — G89.29 CHRONIC SI JOINT PAIN: Primary | ICD-10-CM

## 2020-11-12 DIAGNOSIS — M53.3 CHRONIC SI JOINT PAIN: Primary | ICD-10-CM

## 2020-11-12 DIAGNOSIS — Z23 NEEDS FLU SHOT: ICD-10-CM

## 2020-11-12 PROCEDURE — 90471 IMMUNIZATION ADMIN: CPT | Performed by: NURSE PRACTITIONER

## 2020-11-12 PROCEDURE — 99213 OFFICE O/P EST LOW 20 MIN: CPT | Performed by: NURSE PRACTITIONER

## 2020-11-12 PROCEDURE — 90686 IIV4 VACC NO PRSV 0.5 ML IM: CPT | Performed by: NURSE PRACTITIONER

## 2020-11-12 RX ORDER — DUPILUMAB 300 MG/2ML
INJECTION, SOLUTION SUBCUTANEOUS
COMMUNITY
Start: 2020-11-06

## 2020-11-12 RX ORDER — MELOXICAM 15 MG/1
15 TABLET ORAL DAILY
Qty: 30 TAB | Refills: 2 | Status: SHIPPED | OUTPATIENT
Start: 2020-11-12 | End: 2022-01-18

## 2020-11-12 NOTE — PATIENT INSTRUCTIONS

## 2020-11-12 NOTE — PROGRESS NOTES
Assessment/Plan:     Diagnoses and all orders for this visit:    1. Chronic SI joint pain  -     meloxicam (MOBIC) 15 mg tablet; Take 1 Tab by mouth daily.  -     REFERRAL TO ORTHOPEDICS    She will return to ortho as she has failed PT and conservative therapies. Follow up here as needed. Discussed expected course/resolution/complications of diagnosis in detail with patient. Medication risks/benefits/costs/interactions/alternatives discussed with patient. Pt was given after visit summary which includes diagnoses, current medications & vitals. Pt expressed understanding with the diagnosis and plan          Subjective:      Shawn Peña is a 38 y.o. female who presents for had concerns including Back Pain (patient sees PT for the back pain and it is not helping. very painful and stiff no matter what the patient is doing ). Reports a longstanding history of low back pain. There is no radiation of pain. Previously attempted NSAIDS and PT without improvement. She has previously been evaluated by Dr. Pablo Lanaz for abnormality of the coccyx and recommended PT although she has failed. Study Result     EXAM: XR SACRUM AND COCCYX     INDICATION: Sacral and coccygeal pain.     COMPARISON: None.     FINDINGS: AP and lateral views of the sacrum and coccyx demonstrate subtle  posterior subluxation of the coccyx. No fracture. Normal bone mineralization. Possible subtle erosions of the sacroiliac joints. Hip joints are within normal  limits.     IMPRESSION  IMPRESSION:   1. 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 No fracture. 2. Possible subtle sacroiliitis in the proper clinical setting.          Patient Active Problem List   Diagnosis Code    Fibroadenoma of right breast D24.1    Mastodynia N64.4    Other hyperlipidemia E78.49       Current Outpatient Medications   Medication Sig Dispense Refill    Dupixent Syringe 300 mg/2 mL syrg syringe  meloxicam (MOBIC) 15 mg tablet Take 1 Tab by mouth daily. 30 Tab 2       No Known Allergies    ROS:   Review of Systems   Constitutional: Negative for malaise/fatigue. Eyes: Negative for blurred vision. Respiratory: Negative for shortness of breath. Cardiovascular: Negative for chest pain. Musculoskeletal: Positive for back pain. Objective:     Visit Vitals  /75 (BP 1 Location: Left arm, BP Patient Position: Sitting)   Pulse 79   Temp 98 °F (36.7 °C) (Oral)   Resp 16   Ht 5' 2\" (1.575 m)   Wt 189 lb 3.2 oz (85.8 kg)   LMP 11/08/2020 (Exact Date)   SpO2 97%   BMI 34.61 kg/m²       Vitals and Nurse Documentation reviewed. Physical Exam  Constitutional:       General: She is not in acute distress. Cardiovascular:      Heart sounds: S1 normal and S2 normal. No murmur. No friction rub. No gallop. Pulmonary:      Effort: No respiratory distress. Breath sounds: Normal breath sounds. Skin:     General: Skin is warm and dry.    Psychiatric:         Mood and Affect: Mood and affect normal.

## 2020-11-12 NOTE — PROGRESS NOTES
Chief Complaint   Patient presents with    Back Pain     patient sees PT for the back pain and it is not helping. very painful and stiff no matter what the patient is doing      1. Have you been to the ER, urgent care clinic since your last visit? Hospitalized since your last visit? No     2. Have you seen or consulted any other health care providers outside of the [address] [place] [address] since your last visit? Include any pap smears or colon screening.  No

## 2021-01-25 ENCOUNTER — OFFICE VISIT (OUTPATIENT)
Dept: URGENT CARE | Age: 36
End: 2021-01-25
Payer: COMMERCIAL

## 2021-01-25 VITALS — TEMPERATURE: 98.1 F | RESPIRATION RATE: 16 BRPM | OXYGEN SATURATION: 99 % | HEART RATE: 78 BPM

## 2021-01-25 DIAGNOSIS — Z20.822 SUSPECTED COVID-19 VIRUS INFECTION: Primary | ICD-10-CM

## 2021-01-25 DIAGNOSIS — J02.9 ACUTE PHARYNGITIS, UNSPECIFIED ETIOLOGY: ICD-10-CM

## 2021-01-25 LAB
S PYO AG THROAT QL: NEGATIVE
VALID INTERNAL CONTROL?: YES

## 2021-01-25 PROCEDURE — 99202 OFFICE O/P NEW SF 15 MIN: CPT | Performed by: NURSE PRACTITIONER

## 2021-01-25 PROCEDURE — 87880 STREP A ASSAY W/OPTIC: CPT | Performed by: NURSE PRACTITIONER

## 2021-01-25 NOTE — LETTER
NOTIFICATION RETURN TO WORK / SCHOOL 
 
1/25/2021 9:29 AM 
 
Ms. [place] [address] [address] To Whom It May Concern: 
 
[place] Ma[place] is currently under the care of [facility]. Please allow to quarantine/self isolate until: 02/03/2021 If there are questions or concerns please have the patient contact our office. Sincerely, G[facility] PROVIDER

## 2021-01-25 NOTE — PROGRESS NOTES
Subjective: (As above and below)       This patient was seen in [facility] at [address] [place] [address] Urgent Care while outdoors at their vehicle due to COVID-19 pandemic with PPE and focused examination in order to decrease community viral transmission. The patient/guardian gave verbal consent to treat. Chief Complaint   Patient presents with    Sore Throat     pt reports sore throat and loss of smell x3 days     Scott Neves is a 38 y.o. female who presents for evaluation of : sore throat and loss of smell. Symptom onset 3 days ago . Preceding illness: none. No other identified aggravating or alleviating factors. Symptoms are constant and overall unchanged. Promotes no decrease in PO intake of fluids. Denies: severe lethargy, SOB, syncope/near syncope, vomiting/diarrhea, chest pain, chest pain with breathing, labored breathing, severe headache, non-intractable fevers . Recent travel: no  Known Exposure to COVID-19: no  Known flu or strep contact: no        Review of Systems - negative except as listed above    Reviewed PmHx, RxHx, FmHx, SocHx, AllgHx and updated in chart.   Family History   Problem Relation Age of Onset    No Known Problems Mother     Diabetes Father     Heart Attack Father     No Known Problems Son     No Known Problems Daughter     No Known Problems Daughter     No Known Problems Daughter     No Known Problems Daughter        Past Medical History:   Diagnosis Date    Breast disorder     Breast mass, right 8/2015    Other hyperlipidemia 6/25/2020      Social History     Socioeconomic History    Marital status:      Spouse name: Not on file    Number of children: Not on file    Years of education: Not on file    Highest education level: Not on file   Tobacco Use    Smoking status: Never Smoker    Smokeless tobacco: Never Used   Substance and Sexual Activity    Alcohol use: Never     Frequency: Never    Drug use: Never    Sexual activity: Yes     Partners: Male Birth control/protection: Injection, I.U.D. Comment:    Social History Narrative    ** Merged History Encounter **               Current Outpatient Medications   Medication Sig    Dupixent Syringe 300 mg/2 mL syrg syringe     meloxicam (MOBIC) 15 mg tablet Take 1 Tab by mouth daily. No current facility-administered medications for this visit. Objective:     Vitals:    01/25/21 0901   Pulse: 78   Resp: 16   Temp: 98.1 °F (36.7 °C)   SpO2: 99%       Physical Exam  General appearance  appears well hydrated and does not appear toxic, no acute distress  Eyes - EOMs intact. Non injected. No scleral icterus   Ears - no external swelling  Nose - No purulent drainage  Mouth - OP clear without swelling, exudate or lesion. Mucus membranes moist. Uvula midline. Neck/Lymphatics  trachea midline, full AROM  Chest - Normal breathing effort no wheeze rales, rhonchi or diminishments bilaterally. Heart - RRR, no murmurs  Skin - no observable rashes or pallor  Neurologic- alert and oriented x 3  Psychiatric- normal mood, behavior and though content. Assessment/ Plan:     1. Suspected COVID-19 virus infection    - NOVEL CORONAVIRUS (COVID-19)    2. Acute pharyngitis, unspecified etiology    - AMB POC RAPID STREP A    Rapid strep test negative  No evidence suggesting complication of illness at this time. Will discharge home with close monitoring and follow up. Supportive home care for mild symptoms advised- maintain adequate fluid intake, lozenges, over the counter Tylenol (for fever, aches, pains, chills), deep breathing exercises  Recommendation for self isolation/quarantine based on current CDC guidelines      Follow up: We have reviewed worsening/concerning signs and symptoms that may warrant seeking immediate care in the ED setting. For other non-severe changes, non-improvement or questions, patient aware to contact PCP office or consider a virtual online medical consultation.         Mara Chery Genesis Zuleta NP

## 2021-01-27 LAB — SARS-COV-2, NAA: NOT DETECTED

## 2021-03-22 NOTE — ANCILLARY DISCHARGE INSTRUCTIONS
[facility]  [address]  [place], [address]  Phone: [phone]  Fax: [phone]    Discharge Summary  2-15    Patient name: Harmeet Chavez  : 1985  Provider#:[id number]  Referral source: Derrell Nguyen NP      Medical/Treatment Diagnosis: Chronic SI joint pain [M53.3, G89.29]     Prior Hospitalization: see medical history     Comorbidities: see evaluation  Prior Level of Function:see evaluation  Medications: Verified on Patient Summary List    Start of Care: 2020      Onset Date:see evaluation   Visits from Start of Care: 5     Missed Visits: see CC  Reporting Period : 2020 to 20    Summary of care:   Lumbar Active Movements:  ROM  AROM Comments:pain, area   Forward flexion  Fingers to toes No change   Extension  WNL No change   SB right WNL Increased symptoms   SB left WNL No Change      Strength:       R (0-5) L (0-5)   Hip Flexion (L1,2) 4+ 4+   Knee Extension (L3,4) 5 5   Knee Flexion (S1,2) 5 5   Ankle Dorsiflexion (L4) 5 5   Sidelying hip abduction 4 4   Pt able to perform supine bridge  Pt able to perform supine SLR R and L. Sligh inc'd symptoms in R side of lower back with R SLR     Palpation:    Mod to severe TTP sacrum, L3-L5 spinous processes, R>L lumbar paraspinals        Pain Level (0-10 scale) post treatment: 6/10     ASSESSMENT/Changes in Function:   Patient has been seen for 5 skilled PT visits since 20, she demonstrates improved lumbar AROM and improved function however continues to have significant pain (6-10) with functional tasks. We have reviewed postural principles and gave her final HEP. Recommended patient schedule a f/u with MD secondary to continued high pain levels.          RECOMMENDATIONS:  [x]Discontinue therapy: []Patient has reached or is progressing toward set goals      []Patient is non-compliant or has abdicated      [x]Due to lack of appreciable progress towards set [facility], PT 3/22/2021 1:06 PM

## 2022-01-18 ENCOUNTER — OFFICE VISIT (OUTPATIENT)
Dept: URGENT CARE | Age: 37
End: 2022-01-18
Payer: COMMERCIAL

## 2022-01-18 VITALS — TEMPERATURE: 98.4 F | RESPIRATION RATE: 16 BRPM | HEART RATE: 81 BPM | OXYGEN SATURATION: 97 %

## 2022-01-18 DIAGNOSIS — R05.9 COUGH: ICD-10-CM

## 2022-01-18 DIAGNOSIS — J02.9 SORE THROAT: ICD-10-CM

## 2022-01-18 DIAGNOSIS — Z20.822 EXPOSURE TO COVID-19 VIRUS: Primary | ICD-10-CM

## 2022-01-18 PROCEDURE — S9083 URGENT CARE CENTER GLOBAL: HCPCS | Performed by: FAMILY MEDICINE

## 2022-01-18 NOTE — PROGRESS NOTES
This patient was seen at [address] [place] [address] Urgent Care while in their vehicle due to COVID-19 pandemic with PPE and focused examination in order to decrease community viral transmission. The patient/guardian gave verbal consent to treat. Darek Davenport is a 39 y.o. female who presents with cough, runny nose, fever, ST x 2 days. Was exposed to COVID-19 from household members. Denies SOB, N/V/D. The history is provided by the patient. Past Medical History:   Diagnosis Date    Breast disorder     Breast mass, right 8/2015    Other hyperlipidemia 6/25/2020        Past Surgical History:   Procedure Laterality Date    HX BREAST BIOPSY Right 8/11/2015    RIGHT BREAST EXCISIONAL BIOPSY W ULTRASOUND  performed by Trung Kelly MD at [address] HX BREAST BIOPSY Right 2015    Surgical removal of benign tissue    HX BREAST REDUCTION Bilateral 2018    US GUIDED CORE BREAST BIOPSY Right 2015    benign         Family History   Problem Relation Age of Onset    No Known Problems Mother     Diabetes Father     Heart Attack Father     No Known Problems Son     No Known Problems Daughter     No Known Problems Daughter     No Known Problems Daughter     No Known Problems Daughter         Social History     Socioeconomic History    Marital status:      Spouse name: Not on file    Number of children: Not on file    Years of education: Not on file    Highest education level: Not on file   Occupational History    Not on file   Tobacco Use    Smoking status: Never Smoker    Smokeless tobacco: Never Used   Substance and Sexual Activity    Alcohol use: Never    Drug use: Never    Sexual activity: Yes     Partners: Male     Birth control/protection: Injection, I.U.D.      Comment:    Other Topics Concern    Not on file   Social History Narrative    ** Merged History Encounter **          Social Determinants of Health     Financial Resource Strain:     Difficulty of Paying Living Expenses: Not on file   Food Insecurity:     Worried About [employer] in the Last Year: Not on file    Ada of Food in the Last Year: Not on file   Transportation Needs:     Lack of Transportation (Medical): Not on file    Lack of Transportation (Non-Medical): Not on file   Physical Activity:     Days of Exercise per Week: Not on file    Minutes of Exercise per Session: Not on file   Stress:     Feeling of Stress : Not on file   Social Connections:     Frequency of Communication with Friends and Family: Not on file    Frequency of Social Gatherings with Friends and Family: Not on file    Attends Mandaen Services: Not on file    Active Member of [address] [place] [address] or Organizations: Not on file    Attends Club or Organization Meetings: Not on file    Marital Status: Not on file   Intimate Partner Violence:     Fear of Current or Ex-Partner: Not on file    Emotionally Abused: Not on file    Physically Abused: Not on file    Sexually Abused: Not on file   Housing Stability:     Unable to Pay for Housing in the Last Year: Not on file    Number of [place] in the Last Year: Not on file    Unstable Housing in the Last Year: Not on file                ALLERGIES: Patient has no known allergies. Review of Systems   Constitutional: Positive for fever. Negative for activity change and appetite change. HENT: Positive for rhinorrhea and sore throat. Respiratory: Positive for cough. Negative for shortness of breath. Gastrointestinal: Negative for diarrhea, nausea and vomiting. Vitals:    01/18/22 1350   Pulse: 81   Resp: 16   Temp: 98.4 °F (36.9 °C)   SpO2: 97%       Physical Exam  Vitals and nursing note reviewed. Constitutional:       General: She is not in acute distress. Appearance: She is well-developed. She is not diaphoretic. HENT:      Mouth/Throat:      Mouth: Mucous membranes are moist.      Pharynx: Oropharynx is clear.  No oropharyngeal exudate or posterior oropharyngeal erythema. Pulmonary:      Effort: Pulmonary effort is normal. No respiratory distress. Breath sounds: Normal breath sounds. No stridor. No wheezing, rhonchi or rales. Neurological:      Mental Status: She is alert. Psychiatric:         Behavior: Behavior normal.         Thought Content: Thought content normal.         Judgment: Judgment normal.         MDM    ICD-10-CM ICD-9-CM   1. Exposure to COVID-19 virus  Z20.822 V01.79   2. Cough  R05.9 786.2   3. Sore throat  J02.9 462       Orders Placed This Encounter    NOVEL CORONAVIRUS (COVID-19)     Scheduling Instructions:      1) Due to current limited availability of the COVID-19 PCR test, tests will be prioritized and may not be completed.              2) Order only if the test result will change clinical management or necessary for a return to mission-critical employment decision.              3) Print and instruct patient to adhere to CDC home isolation program. (Link Above)              4) Set up or refer patient for a monitoring program.              5) Have patient sign up for and leverage [employer] (if not previously done). Order Specific Question:   Is this test for diagnosis or screening? Answer:   Diagnosis of ill patient     Order Specific Question:   Symptomatic for COVID-19 as defined by CDC? Answer:   Yes     Order Specific Question:   Date of Symptom Onset     Answer:   1/16/2022     Order Specific Question:   Hospitalized for COVID-19? Answer:   No     Order Specific Question:   Admitted to ICU for COVID-19? Answer:   No     Order Specific Question:   Employed in healthcare setting? Answer:   Unknown     Order Specific Question:   Resident in a congregate (group) care setting? Answer:   No     Order Specific Question:   Pregnant? Answer:   No     Order Specific Question:   Previously tested for COVID-19?      Answer:   Yes        Quarantine, await COVID results  Deep breathing exercises, ambulation  Tylenol prn  Increase fluids with electrolytes if decreased PO intake    If signs and symptoms become worse the pt is to go to the ER.          Procedures

## 2022-01-18 NOTE — LETTER
January 18, 2022  [address] [id number]    Dear Danni Ashton: Thank you for requesting access to [employer]. Please follow the instructions below to securely access and download your online medical record. [employer] allows you to send messages to your doctor, view your test results, renew your prescriptions, schedule appointments, and more. How Do I Sign Up? 1. In your internet browser, go to [email or web address]. 2. Click on the First Time User? Click Here link in the Sign In box. You will see the New Member Sign Up page. 3. Enter your [employer] Access Code exactly as it appears below. You will not need to use this code after youve completed the sign-up process. If you do not sign up before the expiration date, you must request a new code. [employer] Access Code: [email or web address]  Expires: 3/4/2022 12:48 PM     4. Enter the last four digits of your Social Security Number (xxxx) and Date of Birth (mm/dd/yyyy) as indicated and click Submit. You will be taken to the next sign-up page. 5. Create a [employer] ID. This will be your [employer] login ID and cannot be changed, so think of one that is secure and easy to remember. 6. Create a [employer] password. You can change your password at any time. 7. Enter your Password Reset Question and Answer. This can be used at a later time if you forget your password. 8. Enter your e-mail address. You will receive e-mail notification when new information is available in [address]. 9. Click Sign Up. You can now view and download portions of your medical record. 10. Click the Download Summary menu link to download a portable copy of your medical information. Additional Information    If you have questions, please visit the Frequently Asked Questions section of the [employer] website at [email or web address] Remember, [employer] is NOT to be used for urgent needs. For medical emergencies, dial 911.     Now available from your iPhone and Android!     Sincerely,   [employer]

## 2022-01-21 LAB
SARS-COV-2, NAA 2 DAY TAT: NORMAL
SARS-COV-2, NAA: DETECTED

## 2022-01-21 NOTE — PROGRESS NOTES
Spoke with patient regarding positive COVID-19 PCR test. Pt still experiencing sx at this time, Informed pt to quarantine x 10 days from sx onset, start deep breathing exercises and ambulation throughout the day, tylenol PRN. Go to ER for evaluation if sx of SOB and lethargy worsen.  Pt verbalized understanding, no further questions at this time

## 2022-01-25 ENCOUNTER — HOSPITAL ENCOUNTER (EMERGENCY)
Age: 37
Discharge: HOME | End: 2022-01-25
Attending: EMERGENCY MEDICINE
Payer: COMMERCIAL

## 2022-01-25 VITALS
RESPIRATION RATE: 18 BRPM | OXYGEN SATURATION: 100 % | HEART RATE: 84 BPM | SYSTOLIC BLOOD PRESSURE: 131 MMHG | TEMPERATURE: 98 F | DIASTOLIC BLOOD PRESSURE: 77 MMHG

## 2022-01-25 DIAGNOSIS — G89.29 CHRONIC BILATERAL LOW BACK PAIN, UNSPECIFIED WHETHER SCIATICA PRESENT: Primary | ICD-10-CM

## 2022-01-25 DIAGNOSIS — M54.50 CHRONIC BILATERAL LOW BACK PAIN, UNSPECIFIED WHETHER SCIATICA PRESENT: Primary | ICD-10-CM

## 2022-01-25 LAB
APPEARANCE UR: CLEAR
BACTERIA URNS QL MICRO: NEGATIVE /HPF
BILIRUB UR QL: NEGATIVE
COLOR UR: NORMAL
EPITH CASTS URNS QL MICRO: NORMAL /LPF
GLUCOSE UR STRIP.AUTO-MCNC: NEGATIVE MG/DL
HGB UR QL STRIP: NEGATIVE
HYALINE CASTS URNS QL MICRO: NORMAL /LPF (ref 0–5)
KETONES UR QL STRIP.AUTO: NEGATIVE MG/DL
LEUKOCYTE ESTERASE UR QL STRIP.AUTO: NEGATIVE
NITRITE UR QL STRIP.AUTO: NEGATIVE
PH UR STRIP: 5 [PH] (ref 5–8)
PROT UR STRIP-MCNC: NEGATIVE MG/DL
RBC #/AREA URNS HPF: NORMAL /HPF (ref 0–5)
SP GR UR REFRACTOMETRY: 1.03 (ref 1–1.03)
UR CULT HOLD, URHOLD: NORMAL
UROBILINOGEN UR QL STRIP.AUTO: 0.2 EU/DL (ref 0.2–1)
WBC URNS QL MICRO: NORMAL /HPF (ref 0–4)

## 2022-01-25 PROCEDURE — 81001 URINALYSIS AUTO W/SCOPE: CPT

## 2022-01-25 PROCEDURE — 74011000250 HC RX REV CODE- 250: Performed by: PHYSICIAN ASSISTANT

## 2022-01-25 PROCEDURE — 74011250637 HC RX REV CODE- 250/637: Performed by: PHYSICIAN ASSISTANT

## 2022-01-25 PROCEDURE — 99283 EMERGENCY DEPT VISIT LOW MDM: CPT

## 2022-01-25 PROCEDURE — 96372 THER/PROPH/DIAG INJ SC/IM: CPT

## 2022-01-25 PROCEDURE — 74011250636 HC RX REV CODE- 250/636: Performed by: PHYSICIAN ASSISTANT

## 2022-01-25 RX ORDER — MORPHINE SULFATE 4 MG/ML
4 INJECTION INTRAVENOUS
Status: COMPLETED | OUTPATIENT
Start: 2022-01-25 | End: 2022-01-25

## 2022-01-25 RX ORDER — METHOCARBAMOL 500 MG/1
500 TABLET, FILM COATED ORAL
Qty: 30 TABLET | Refills: 0 | Status: SHIPPED | OUTPATIENT
Start: 2022-01-25 | End: 2022-01-25 | Stop reason: SDUPTHER

## 2022-01-25 RX ORDER — ACETAMINOPHEN 500 MG
1000 TABLET ORAL
Status: COMPLETED | OUTPATIENT
Start: 2022-01-25 | End: 2022-01-25

## 2022-01-25 RX ORDER — LIDOCAINE 50 MG/G
PATCH TOPICAL
Qty: 15 EACH | Refills: 0 | Status: SHIPPED | OUTPATIENT
Start: 2022-01-25 | End: 2022-01-25 | Stop reason: SDUPTHER

## 2022-01-25 RX ORDER — METHOCARBAMOL 500 MG/1
500 TABLET, FILM COATED ORAL
Qty: 30 TABLET | Refills: 0 | Status: SHIPPED | OUTPATIENT
Start: 2022-01-25

## 2022-01-25 RX ORDER — METHOCARBAMOL 500 MG/1
500 TABLET, FILM COATED ORAL
Status: COMPLETED | OUTPATIENT
Start: 2022-01-25 | End: 2022-01-25

## 2022-01-25 RX ORDER — LIDOCAINE 4 G/100G
1 PATCH TOPICAL EVERY 24 HOURS
Status: DISCONTINUED | OUTPATIENT
Start: 2022-01-25 | End: 2022-01-25 | Stop reason: HOSPADM

## 2022-01-25 RX ORDER — KETOROLAC TROMETHAMINE 30 MG/ML
30 INJECTION, SOLUTION INTRAMUSCULAR; INTRAVENOUS
Status: COMPLETED | OUTPATIENT
Start: 2022-01-25 | End: 2022-01-25

## 2022-01-25 RX ORDER — LIDOCAINE 50 MG/G
PATCH TOPICAL
Qty: 15 EACH | Refills: 0 | Status: SHIPPED | OUTPATIENT
Start: 2022-01-25

## 2022-01-25 RX ORDER — DICLOFENAC SODIUM 75 MG/1
75 TABLET, DELAYED RELEASE ORAL
Qty: 20 TABLET | Refills: 0 | Status: SHIPPED | OUTPATIENT
Start: 2022-01-25 | End: 2022-01-25 | Stop reason: SDUPTHER

## 2022-01-25 RX ORDER — DICLOFENAC SODIUM 75 MG/1
75 TABLET, DELAYED RELEASE ORAL
Qty: 20 TABLET | Refills: 0 | Status: SHIPPED | OUTPATIENT
Start: 2022-01-25

## 2022-01-25 RX ADMIN — MORPHINE SULFATE 4 MG: 4 INJECTION INTRAVENOUS at 11:33

## 2022-01-25 RX ADMIN — ACETAMINOPHEN 1000 MG: 500 TABLET ORAL at 10:02

## 2022-01-25 RX ADMIN — KETOROLAC TROMETHAMINE 30 MG: 30 INJECTION, SOLUTION INTRAMUSCULAR; INTRAVENOUS at 10:02

## 2022-01-25 RX ADMIN — METHOCARBAMOL TABLETS 500 MG: 500 TABLET, COATED ORAL at 10:02

## 2022-01-25 NOTE — ED PROVIDER NOTES
38 y/o female presenting with complaint of low back pain. The patient reports a 1 year history of chronic low back pain, but states that 4 days ago the pain suddenly worsened. She denies any recent falls, heavy lifting, or other traumatic injuries. She has tried ibuprofen and Tylenol without relief. The pain is rated 9/10, radiating to her left knee. She reports some recent dysuria but denies fevers, vomiting, diarrhea, urgency/frequency, weakness, numbness, saddle anesthesia or fecal incontinence. The history is provided by the patient. Past Medical History:   Diagnosis Date    Breast disorder     Breast mass, right 8/2015    Other hyperlipidemia 6/25/2020       Past Surgical History:   Procedure Laterality Date    HX BREAST BIOPSY Right 8/11/2015    RIGHT BREAST EXCISIONAL BIOPSY W ULTRASOUND  performed by Radha Mulligan MD at [address] HX BREAST BIOPSY Right 2015    Surgical removal of benign tissue    HX BREAST REDUCTION Bilateral 2018    US GUIDED CORE BREAST BIOPSY Right 2015    benign         Family History:   Problem Relation Age of Onset    No Known Problems Mother     Diabetes Father     Heart Attack Father     No Known Problems Son     No Known Problems Daughter     No Known Problems Daughter     No Known Problems Daughter     No Known Problems Daughter        Social History     Socioeconomic History    Marital status:      Spouse name: Not on file    Number of children: Not on file    Years of education: Not on file    Highest education level: Not on file   Occupational History    Not on file   Tobacco Use    Smoking status: Never Smoker    Smokeless tobacco: Never Used   Substance and Sexual Activity    Alcohol use: Never    Drug use: Never    Sexual activity: Yes     Partners: Male     Birth control/protection: Injection, I.U.D.      Comment:    Other Topics Concern    Not on file   Social History Narrative    ** Merged History Encounter **          Social Determinants of Health     Financial Resource Strain:     Difficulty of Paying Living Expenses: Not on file   Food Insecurity:     Worried About Running Out of Food in the Last Year: Not on file    Ada of Food in the Last Year: Not on file   Transportation Needs:     Lack of Transportation (Medical): Not on file    Lack of Transportation (Non-Medical): Not on file   Physical Activity:     Days of Exercise per Week: Not on file    Minutes of Exercise per Session: Not on file   Stress:     Feeling of Stress : Not on file   Social Connections:     Frequency of Communication with Friends and Family: Not on file    Frequency of Social Gatherings with Friends and Family: Not on file    Attends Spiritism Services: Not on file    Active Member of [address] [place] [address] [employer] or Organizations: Not on file    Attends Club or Organization Meetings: Not on file    Marital Status: Not on file   Intimate Partner Violence:     Fear of Current or Ex-Partner: Not on file    Emotionally Abused: Not on file    Physically Abused: Not on file    Sexually Abused: Not on file   Housing Stability:     Unable to Pay for Housing in the Last Year: Not on file    Number of [place] in the Last Year: Not on file    Unstable Housing in the Last Year: Not on file         ALLERGIES: Patient has no known allergies. Review of Systems   Constitutional: Negative for chills and fever. HENT: Negative for congestion. Respiratory: Negative for cough. Gastrointestinal: Negative for diarrhea and vomiting. Genitourinary: Positive for dysuria. Negative for frequency and urgency. Musculoskeletal: Positive for back pain. Skin: Negative for wound. Neurological: Negative for weakness and numbness. Vitals:    01/25/22 0855   BP: 131/77   Pulse: 84   Resp: 18   Temp: 98 °F (36.7 °C)   SpO2: 100%            Physical Exam  Vitals and nursing note reviewed.    Constitutional:       General: She is not in acute distress. Appearance: She is well-developed. She is not diaphoretic. HENT:      Head: Normocephalic and atraumatic. Eyes:      Conjunctiva/sclera: Conjunctivae normal.   Cardiovascular:      Rate and Rhythm: Normal rate and regular rhythm. Heart sounds: Normal heart sounds. Pulmonary:      Effort: Pulmonary effort is normal.      Breath sounds: Normal breath sounds. Musculoskeletal:      Cervical back: Normal range of motion and neck supple. Comments: Generalized midline and bilateral muscular lumbar tenderness palpation, left worse than right. Skin:     General: Skin is warm and dry. Neurological:      Mental Status: She is alert and oriented to person, place, and time. Comments: 5/5 strength of bilateral lower extremities with intact light touch sensation. Ambulatory without assistance. MDM       Procedures        40 y/o female presenting with complaint of low back pain. History and exam consistent with muscular strain, nonspecific MSK back pain, or disc herniation/lumbar radiculopathy. No history of trauma or midline tenderness - doubt fractures or ligamentous injuries. UA is negative for UTI. No neuro deficits or history of saddle anesthesia or bladder/bowel dysfunction - doubt cauda equina syndrome or acute cord compression. Nothing in the history or exam to concern me for AAA or epidural abscess. Safe for discharge home, with Rx for diclofenac, robaxin and lidocaine patches. PCP or orthopedic follow up if pain continues. Strict ED return precautions discussed and provided in writing at time of discharge. The patient verbalized understanding and agreement with this plan.

## 2022-01-25 NOTE — ED NOTES
Pt reports chronic lower back pain worse over the last days. Pt sees a orthopedic doctor for her back. Pt had an MRI about 1 year ago. Pt denies injury or fall.

## 2022-01-25 NOTE — ED NOTES
Pt discharged with instructions reviewed and prescriptions reviewed and verbally understood. Pt ambulatory upon discharge and left with daughter.

## 2022-01-26 ENCOUNTER — TELEPHONE (OUTPATIENT)
Dept: FAMILY MEDICINE CLINIC | Age: 37
End: 2022-01-26

## 2022-01-26 ENCOUNTER — PATIENT OUTREACH (OUTPATIENT)
Dept: CASE MANAGEMENT | Age: 37
End: 2022-01-26

## 2022-01-26 NOTE — PROGRESS NOTES
Ambulatory Care Coordination ED COVID Follow up Call    Challenges to be reviewed by the provider   Additional needs identified to be addressed with provider yes  This patient is covid positive, but is having back pain that is debilitating. She has been to the ER 2x with no real relief. Ortho is unable to see her until 2/22/22. She was advised to make appt with you ASAP. Encounter was routed to provider for escalation. Method of communication with provider : chart routing    Discussed COVID-19 related testing which was available at this time. Test results were positive. Patient informed of results, if available? yes. Current Symptoms: pain or aching joints, no new symptoms and no worsening symptoms   Patient has chronic back pain which is causing her to be only able to go to the bathroom and back with much difficulty. Reviewed New or Changed Meds: had no questions  Do you have what you need at home?  Durable Medical Equipment ordered at discharge: None   Home Health/Outpatient orders at discharge: none    Pulse oximeter? no Discussed and confirmed pulse oximeter discharge instructions and when to notify provider or seek emergency care. Patient education provided: Reviewed appropriate site of care based on symptoms and resources available to patient including: PCP, Specialist and When to call 911. Follow up appointment recommended: yes. If no appointment scheduled, scheduling offered: no.  Future Appointments   Date Time Provider Kayla Marrero   2/22/2022  1:20 PM Reagan Jack PA-C [facility]   She called Ortho this morning, they are unable to see sooner. Was advised to make PCP appt. Interventions: Obtained and reviewed discharge summary and/or continuity of care documents  Reviewed discharge instructions, medical action plan and red flags with patient who verbalized understanding. Provided contact information for future needs.  Plan for follow-up call in 5-7 days based on severity of symptoms and risk factors. Plan for next call: symptom management-ensure symptoms resolved.      April Marvin

## 2022-01-28 ENCOUNTER — TELEPHONE (OUTPATIENT)
Dept: FAMILY MEDICINE CLINIC | Age: 37
End: 2022-01-28

## 2022-01-28 NOTE — TELEPHONE ENCOUNTER
Elio lili (Self) [phone] (M)     Pt stated that her back hurts too much to work and would like a doctors note until her appointment on 2/17/22. Please call back and advise.

## 2022-01-31 ENCOUNTER — OFFICE VISIT (OUTPATIENT)
Dept: FAMILY MEDICINE CLINIC | Age: 37
End: 2022-01-31
Payer: COMMERCIAL

## 2022-01-31 VITALS
TEMPERATURE: 98.3 F | HEART RATE: 80 BPM | RESPIRATION RATE: 16 BRPM | BODY MASS INDEX: 34.37 KG/M2 | OXYGEN SATURATION: 98 % | WEIGHT: 186.8 LBS | DIASTOLIC BLOOD PRESSURE: 70 MMHG | HEIGHT: 62 IN | SYSTOLIC BLOOD PRESSURE: 107 MMHG

## 2022-01-31 DIAGNOSIS — M54.42 ACUTE LEFT-SIDED LOW BACK PAIN WITH LEFT-SIDED SCIATICA: Primary | ICD-10-CM

## 2022-01-31 PROCEDURE — 99213 OFFICE O/P EST LOW 20 MIN: CPT | Performed by: NURSE PRACTITIONER

## 2022-01-31 NOTE — TELEPHONE ENCOUNTER
Chief Complaint   Patient presents with    Letter for School/Work     Patient requesting note for work.   [facility]

## 2022-01-31 NOTE — PROGRESS NOTES
HISTORY OF PRESENT ILLNESS  Karly Curtis is a 39 y.o. female. HPI  Follow up ED visit. Records reviewed and summarized as follows:  Seen at [facility] ED on 1/27/22 with c/o low back back pain radiating down left leg. No recent injury. Has had problems with similar symptoms in the past.  Followed by Dr. Cherri Franco. Scheduled upcoming appointment 2/22. No imaging done in ED. Taking voltaren and robaxin, using lidocaine patch with minor sx relief. Sx aggravated by sitting and walking, relieved with lying down. No change in bowel/bladder. Requesting work note. Employed at [employer], does customer shopping, requires lifting, bending, walking. 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  lidocaine (Lidoderm) 5 % Apply patch to the affected area for 12 hours a day and remove for 12 hours a day.  methocarbamoL (Robaxin) 500 mg tablet Take 1 Tablet by mouth three (3) times daily as needed for Muscle Spasm(s) or Pain.  Dupixent Syringe 300 mg/2 mL syrg syringe      No current facility-administered medications for this visit. Social History     Tobacco Use    Smoking status: Never Smoker    Smokeless tobacco: Never Used   Vaping Use    Vaping Use: Never used   Substance Use Topics    Alcohol use: Never    Drug use: Never     Blood pressure 107/70, pulse 80, temperature 98.3 °F (36.8 °C), temperature source Temporal, resp. rate 16, height 5' 2\" (1.575 m), weight 186 lb 12.8 oz (84.7 kg), SpO2 98 %, unknown if currently breastfeeding. Review of Systems   Musculoskeletal: Positive for back pain. Neurological: Negative for tingling, sensory change and weakness. All other systems reviewed and are negative.       Physical Exam  Constitutional:       General: She is not in acute distress. Musculoskeletal:      Lumbar back: Tenderness (left lumbosacral) present. No swelling. Normal range of motion. Negative right straight leg raise test and negative left straight leg raise test.   Neurological:      Sensory: Sensation is intact. Motor: Motor function is intact. Coordination: Coordination is intact. Gait: Gait normal.      Deep Tendon Reflexes:      Reflex Scores:       Patellar reflexes are 2+ on the right side and 2+ on the left side. Achilles reflexes are 2+ on the right side and 2+ on the left side. ASSESSMENT and PLAN  Diagnoses and all orders for this visit:    1. Acute left-sided low back pain with left-sided sciatica    Improving slowly. Continue voltaren, robaxin. Add icy hot topical to affected area. Strongly advised to keep walking. Stretches and stabilization exercises reviewed. Handout given. Follow up as scheduled with Dr. Shira Jay. Work note given. Follow up prn. We discussed the expected course, resolution and complications of the diagnosis in detail. Medication risks, benefits, costs, interactions and side effects were discussed. The patient was advised to contact the office for worsening of condition or FTI as anticipated. The patient expressed understanding of the diagnosis and plan.

## 2022-01-31 NOTE — LETTER
NOTIFICATION RETURN TO WORK / SCHOOL    1/31/2022 9:50 AM    Ms. Loren Ahumada  [address] [place] [address]      To Whom It May Concern:    Loren Ahumada is currently under the care of KWESI Andrade. She will return to work/school on: 2/10/22. If there are questions or concerns please have the patient contact our office.         Sincerely,      Ivis Sandoval NP

## 2022-01-31 NOTE — PATIENT INSTRUCTIONS

## 2022-01-31 NOTE — PROGRESS NOTES
Chief Complaint   Patient presents with    Back Pain    Neck Pain    Form Completion     needs letter stating that patient was out of work due to back pain - informed patient there is a form from work normally for us to fill out        1. \"Have you been to the ER, urgent care clinic since your last visit? Hospitalized since your last visit? \" Yes When: 01/25/22 Where: [facility] Reason for visit: back pain    2. \"Have you seen or consulted any other health care providers outside of the [address] [place] [address] since your last visit? \" No     3. For patients over 45: Has the patient had a colonoscopy? Yes - no Care Gap present     If the patient is female:    4. For patients over 40: Has the patient had a mammogram? Yes - no Care Gap present    5. For patients over 21: Has the patient had a pap smear?  Yes - no Care Gap present       3 most recent PHQ Screens 1/31/2022   Little interest or pleasure in doing things Not at all   Feeling down, depressed, irritable, or hopeless Not at all   Total Score PHQ 2 0

## 2022-02-01 ENCOUNTER — PATIENT OUTREACH (OUTPATIENT)
Dept: CASE MANAGEMENT | Age: 37
End: 2022-02-01

## 2022-02-01 NOTE — TELEPHONE ENCOUNTER
She is already established with Dr. Supriya Reynolds, the back specialist.  Has she scheduled to see him?

## 2022-02-01 NOTE — TELEPHONE ENCOUNTER
Spoke with Pt about letter that was requested.  Informed Pt to contact the Dr. Supriya Reynolds per [facility]

## 2022-02-01 NOTE — PROGRESS NOTES
02/01/22  10:08 AM  Follow Up Call    Challenges to be reviewed by the provider   Additional needs identified to be addressed with provider: no  none           Encounter was not routed to provider for escalation. Method of communication with provider: none. Contacted the patient by telephone to follow up after ED. Status: improved      [address] follow up appointment(s):   Future Appointments   Date Time Provider Kayla Marrero   2/17/2022 11:15 AM Citlali Hi NP [facility] BS AMB   2/22/2022  1:20 PM Regaan Jack PA-C [facility] BS AMB     Non-[facility] follow up appointment(s): none at this time   Follow up appointment completed? yes. Provided contact information for future needs. No further follow-up call indicated based on severity of symptoms and risk factors.       Stella Braun

## 2022-02-08 ENCOUNTER — TELEPHONE (OUTPATIENT)
Dept: FAMILY MEDICINE CLINIC | Age: 37
End: 2022-02-08

## 2022-02-16 ENCOUNTER — OFFICE VISIT (OUTPATIENT)
Dept: ORTHOPEDIC SURGERY | Age: 37
End: 2022-02-16
Payer: COMMERCIAL

## 2022-02-16 VITALS — WEIGHT: 185 LBS | BODY MASS INDEX: 34.04 KG/M2 | HEIGHT: 62 IN

## 2022-02-16 DIAGNOSIS — M51.16 LUMBAR DISC HERNIATION WITH RADICULOPATHY: ICD-10-CM

## 2022-02-16 DIAGNOSIS — M54.50 LUMBAR BACK PAIN: Primary | ICD-10-CM

## 2022-02-16 PROCEDURE — 99205 OFFICE O/P NEW HI 60 MIN: CPT | Performed by: PHYSICIAN ASSISTANT

## 2022-02-16 RX ORDER — HYDROCODONE BITARTRATE AND ACETAMINOPHEN 5; 325 MG/1; MG/1
1 TABLET ORAL
Qty: 30 TABLET | Refills: 0 | Status: SHIPPED | OUTPATIENT
Start: 2022-02-16 | End: 2022-02-16

## 2022-02-16 RX ORDER — DICLOFENAC SODIUM 75 MG/1
75 TABLET, DELAYED RELEASE ORAL 2 TIMES DAILY WITH MEALS
Qty: 60 TABLET | Refills: 1 | Status: SHIPPED | OUTPATIENT
Start: 2022-02-16

## 2022-02-16 RX ORDER — METHYLPREDNISOLONE 4 MG/1
TABLET ORAL
Qty: 1 DOSE PACK | Refills: 0 | Status: SHIPPED | OUTPATIENT
Start: 2022-02-16

## 2022-02-16 RX ORDER — HYDROCODONE BITARTRATE AND ACETAMINOPHEN 5; 325 MG/1; MG/1
1 TABLET ORAL
Qty: 30 TABLET | Refills: 0 | Status: SHIPPED | OUTPATIENT
Start: 2022-02-16 | End: 2022-02-24

## 2022-02-16 NOTE — PROGRESS NOTES
Elizabeth Mac (: 1985) is a 39 y.o. female, new patient, here for evaluation of the following chief complaint(s):  No chief complaint on file. SUBJECTIVE/OBJECTIVE:  Elizabeth Mac (: 1985) is a 39 y.o. female. Patient presents for evaluation of lumbar back pain which has been present for greater than 1 year. Patient seen last year for similar issues and given prescriptions for steroids, NSAIDs and referred to outpatient physical therapy. Patient states that physical therapy offered no lasting benefit. Patient states she had a severe flareup of back pain in late January and was seen at the emergency room at [facility].  Patient states she was given prescriptions for an anti-inflammatory muscle relaxer. Patient describes sharp stabbing pain in the bilateral lower lumbar region with radiation to the left lateral thigh and lower leg. Patient states her pain was so severe as she was bedbound for several days secondary to severe pain. Patient states she has been unable to work because of severe pain. Patient denies lower extremity numbness or tingling but states lower extremity weakness. Patient rates her current pain level as an 89/10. Patient denies saddle paresthesia/anesthesia. No Known Allergies    Current Outpatient Medications   Medication Sig    diclofenac EC (VOLTAREN) 75 mg EC tablet Take 1 Tablet by mouth two (2) times daily (with meals). Indications: PRN pain    methylPREDNISolone (MEDROL DOSEPACK) 4 mg tablet Per dose pack instructions    HYDROcodone-acetaminophen (NORCO) 5-325 mg per tablet Take 1 Tablet by mouth every six (6) hours as needed for Pain for up to 8 days. Max Daily Amount: 4 Tablets.  diclofenac EC (VOLTAREN) 75 mg EC tablet Take 1 Tablet by mouth two (2) times daily as needed for Pain.  lidocaine (Lidoderm) 5 % Apply patch to the affected area for 12 hours a day and remove for 12 hours a day.     methocarbamoL (Robaxin) 500 mg tablet Take 1 Tablet by mouth three (3) times daily as needed for Muscle Spasm(s) or Pain.  Dupixent Syringe 300 mg/2 mL syrg syringe      No current facility-administered medications for this visit. Social History     Socioeconomic History    Marital status:      Spouse name: Not on file    Number of children: Not on file    Years of education: Not on file    Highest education level: Not on file   Occupational History    Not on file   Tobacco Use    Smoking status: Never Smoker    Smokeless tobacco: Never Used   Vaping Use    Vaping Use: Never used   Substance and Sexual Activity    Alcohol use: Never    Drug use: Never    Sexual activity: Yes     Partners: Male     Birth control/protection: Injection, I.U.D. Comment:    Other Topics Concern    Not on file   Social History Narrative    ** Merged History Encounter **          Social Determinants of Health     Financial Resource Strain:     Difficulty of Paying Living Expenses: Not on file   Food Insecurity:     Worried About Running Out of Food in the Last Year: Not on file    Ada of Food in the Last Year: Not on file   Transportation Needs:     Lack of Transportation (Medical): Not on file    Lack of Transportation (Non-Medical):  Not on file   Physical Activity:     Days of Exercise per Week: Not on file    Minutes of Exercise per Session: Not on file   Stress:     Feeling of Stress : Not on file   Social Connections:     Frequency of Communication with Friends and Family: Not on file    Frequency of Social Gatherings with Friends and Family: Not on file    Attends Restorationism Services: Not on file    Active Member of Clubs or Organizations: Not on file    Attends Club or Organization Meetings: Not on file    Marital Status: Not on file   Intimate Partner Violence:     Fear of Current or Ex-Partner: Not on file    Emotionally Abused: Not on file    Physically Abused: Not on file    Sexually Abused: Not on file Housing Stability:     Unable to Pay for Housing in the Last Year: Not on file    Number of Places Lived in the Last Year: Not on file    Unstable Housing in the Last Year: Not on file       Past Surgical History:   Procedure Laterality Date    HX BREAST BIOPSY Right 2015    RIGHT BREAST EXCISIONAL BIOPSY W ULTRASOUND  performed by Trung Kelly MD at [address] HX BREAST BIOPSY Right     Surgical removal of benign tissue    HX BREAST REDUCTION Bilateral 2018    US GUIDED CORE BREAST BIOPSY Right     benign       Family History   Problem Relation Age of Onset    No Known Problems Mother     Diabetes Father     Heart Attack Father     No Known Problems Son     No Known Problems Daughter     No Known Problems Daughter     No Known Problems Daughter     No Known Problems Daughter         OB History        5    Para   5    Term   5       0    AB   0    Living   1       SAB   0    IAB   0    Ectopic   0    Molar   0    Multiple        Live Births   1          Obstetric Comments   Menarche:  14. LMP: 2014. # of Children:  4. Age at Delivery of First Child:  20.   Hysterectomy/oophorectomy:  NO/NO. Breast Bx:  yes. Hx of Breast Feeding:  yes. BCP:  yes. Hormone therapy:  no.                   REVIEW OF SYSTEMS:    Patient denies any recent fever, chills, nausea, vomiting, chest pain, abdominal pain, blurred vision, dizziness or shortness of breath. Vitals: There were no vitals taken for this visit. There is no height or weight on file to calculate BMI. PHYSICAL EXAM:    The patient is alert and oriented x3 and in no acute distress. Patient ambulates with right antalgia without gait aids. Patient appears to be in severe pain particularly going from sitting to standing and getting on and off the examination table.   Sensory testing in all major nerve distributions in the lower extremities is intact and symmetric in the right lateral dorsum of the foot and lateral calf which are diminished versus the left. Manual motor testing of the major muscle groups of the lower extremities including dorsiflexion/EHL/plantarflexion, knee flexion/extension, hip flexion/extension/abduction/adduction is intact and symmetric in the bilateral lower extremities with the exception of right knee flexion/extension which is weak and graded 3/5 and right hip flexion which is weak and graded 3/5. Barnetta Law Deep tendon reflexes + 3/4 in the right knee and +2/4 in the left knee, and is considered +2/4 in the bilateral Achilles tendons. Negative straight leg raise maneuver on the left and is considered markedly positive on the right. No evidence of clonus bilaterally. Babinski's downgoing and symmetric bilaterally. Patient is noted to have severely limited forward flexion which elicits significant pain and is limited to 50 degrees, lumbar extension limited to 10 degrees, lateral lumbar bending limited to 20 degrees bilaterally. Distal pulses 2+ and symmetric bilaterally. There is no tenderness to palpation of the bilateral lower lumbar and sacral regions. IMAGING:    Results from Appointment encounter on 02/16/22    XR SPINE LUMB MIN 4 V    Narrative  AP, lateral, flexion and extension digital view radiographs of the lumbar spine obtained in the office today and was reviewed demonstrate good preservation of vertebral body height and intervertebral disc height. There are no gross motion segments seen on flexion/extension views. Overall lumbar alignment is lordotic. There is no evidence of fracture, lytic lesion or acute bony abnormality. Orders Placed This Encounter    XR SPINE LUMB MIN 4 V     Standing Status:   Future     Number of Occurrences:   1     Standing Expiration Date:   2/17/2023     Order Specific Question:   Is Patient Pregnant?      Answer:   Unknown    MRI LUMB SPINE WO CONT     Standing Status:   Future     Standing Expiration Date:   3/16/2023     Order Specific Question:   Is Patient Pregnant? Answer:   Unknown    diclofenac EC (VOLTAREN) 75 mg EC tablet     Sig: Take 1 Tablet by mouth two (2) times daily (with meals). Indications: PRN pain     Dispense:  60 Tablet     Refill:  1    methylPREDNISolone (MEDROL DOSEPACK) 4 mg tablet     Sig: Per dose pack instructions     Dispense:  1 Dose Pack     Refill:  0    DISCONTD: HYDROcodone-acetaminophen (NORCO) 5-325 mg per tablet     Sig: Take 1 Tablet by mouth every six (6) hours as needed for Pain for up to 7 days. Max Daily Amount: 4 Tablets. Dispense:  30 Tablet     Refill:  0    HYDROcodone-acetaminophen (NORCO) 5-325 mg per tablet     Sig: Take 1 Tablet by mouth every six (6) hours as needed for Pain for up to 8 days. Max Daily Amount: 4 Tablets. Dispense:  30 Tablet     Refill:  0          ASSESSMENT/PLAN:      1. Lumbar back pain  -     XR SPINE LUMB MIN 4 V; Future  -     HYDROcodone-acetaminophen (NORCO) 5-325 mg per tablet; Take 1 Tablet by mouth every six (6) hours as needed for Pain for up to 8 days. Max Daily Amount: 4 Tablets., Normal, Disp-30 Tablet, R-0  -     MRI LUMB SPINE WO CONT; Future  2. Lumbar disc herniation with radiculopathy  -     MRI LUMB SPINE WO CONT; Future        Below is the assessment and plan developed based on review of pertinent history, physical exam, labs, studies, and medications. Have discussed the diagnosis and radiographic findings at length and answered all patient questions to her questions to their satisfaction. Have sent prescriptions for steroids, NSAIDs and hydrocodone patient's preferred pharmacy electronically. Given the patient's ongoing pain despite conservative management have referred the patient for MRI lumbar to assess for severe stenosis vs disc herniation. We will plan on seeing the patient back for reevaluation and further treatment recommendations once imaging results are available for review.   The patient understands and agrees to the treatment plan as outlined above. No follow-ups on file. Dr. Wilfredo Lagunas was available for immediate consultation as needed. An electronic signature was used to authenticate this note.   -- HERMELINDA OlmosC

## 2022-02-23 ENCOUNTER — DOCUMENTATION ONLY (OUTPATIENT)
Dept: ORTHOPEDIC SURGERY | Age: 37
End: 2022-02-23

## 2022-02-25 ENCOUNTER — HOSPITAL ENCOUNTER (OUTPATIENT)
Dept: MRI IMAGING | Age: 37
Discharge: HOME | End: 2022-02-25
Attending: PHYSICIAN ASSISTANT
Payer: COMMERCIAL

## 2022-02-25 DIAGNOSIS — M51.16 LUMBAR DISC HERNIATION WITH RADICULOPATHY: ICD-10-CM

## 2022-02-25 DIAGNOSIS — M54.50 LUMBAR BACK PAIN: ICD-10-CM

## 2022-02-25 PROCEDURE — 72148 MRI LUMBAR SPINE W/O DYE: CPT

## 2022-03-10 ENCOUNTER — DOCUMENTATION ONLY (OUTPATIENT)
Dept: ORTHOPEDIC SURGERY | Age: 37
End: 2022-03-10

## 2022-03-16 ENCOUNTER — DOCUMENTATION ONLY (OUTPATIENT)
Dept: ORTHOPEDIC SURGERY | Age: 37
End: 2022-03-16

## 2022-03-19 PROBLEM — E78.49 OTHER HYPERLIPIDEMIA: Status: ACTIVE | Noted: 2020-06-25

## 2022-03-22 ENCOUNTER — OFFICE VISIT (OUTPATIENT)
Dept: ORTHOPEDIC SURGERY | Age: 37
End: 2022-03-22
Payer: COMMERCIAL

## 2022-03-22 VITALS — BODY MASS INDEX: 34.04 KG/M2 | HEIGHT: 62 IN | WEIGHT: 185 LBS

## 2022-03-22 DIAGNOSIS — M51.36 ANNULAR TEAR OF LUMBAR DISC: ICD-10-CM

## 2022-03-22 DIAGNOSIS — M54.50 LUMBAR BACK PAIN: Primary | ICD-10-CM

## 2022-03-22 PROCEDURE — 99214 OFFICE O/P EST MOD 30 MIN: CPT | Performed by: PHYSICIAN ASSISTANT

## 2022-03-22 NOTE — PROGRESS NOTES
Ofelia Holley (: 1985) is a 39 y.o. female, established patient, here for evaluation of the following chief complaint(s):  Back Pain         SUBJECTIVE/OBJECTIVE:    Ofelia Holley (: 1985) is a 39 y.o. female. Patient returns for follow-up evaluation of lumbar back pain which has been present for greater than 1 year. Patient states she had severe flareup of back pain in late January and was seen in the emergency room at [facility].  Patient describes sharp stabbing pain in the bilateral lower lumbar region with radiation to the left lateral thigh and lower leg. Today in the office patient states pain is mainly in the midline lower lumbar region without lower extremity symptoms. Initially patient states she was bedbound for several days. 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 Patient is not taking any pain medication on a routine basis at this time and rates her pain level as a 5-6/10. On the basis of her last visit patient was referred for MRI and returns for review today. Patient denies saddle paresthesia/anesthesia. No Known Allergies    Current Outpatient Medications   Medication Sig    diclofenac EC (VOLTAREN) 75 mg EC tablet Take 1 Tablet by mouth two (2) times daily (with meals). Indications: PRN pain    methylPREDNISolone (MEDROL DOSEPACK) 4 mg tablet Per dose pack instructions    diclofenac EC (VOLTAREN) 75 mg EC tablet Take 1 Tablet by mouth two (2) times daily as needed for Pain.  lidocaine (Lidoderm) 5 % Apply patch to the affected area for 12 hours a day and remove for 12 hours a day.  methocarbamoL (Robaxin) 500 mg tablet Take 1 Tablet by mouth three (3) times daily as needed for Muscle Spasm(s) or Pain.  Dupixent Syringe 300 mg/2 mL syrg syringe      No current facility-administered medications for this visit.        Social History     Socioeconomic History    Marital status:      Spouse name: Not on file    Number of children: Not on file    Years of education: Not on file    Highest education level: Not on file   Occupational History    Not on file   Tobacco Use    Smoking status: Never Smoker    Smokeless tobacco: Never Used   Vaping Use    Vaping Use: Never used   Substance and Sexual Activity    Alcohol use: Never    Drug use: Never    Sexual activity: Yes     Partners: Male     Birth control/protection: Injection, I.U.D. Comment:    Other Topics Concern    Not on file   Social History Narrative    ** Merged History Encounter **          Social Determinants of Health     Financial Resource Strain:     Difficulty of Paying Living Expenses: Not on file   Food Insecurity:     Worried About Running Out of Food in the Last Year: Not on file    Ada of Food in the Last Year: Not on file   Transportation Needs:     Lack of Transportation (Medical): Not on file    Lack of Transportation (Non-Medical):  Not on file   Physical Activity:     Days of Exercise per Week: Not on file    Minutes of Exercise per Session: Not on file   Stress:     Feeling of Stress : Not on file   Social Connections:     Frequency of Communication with Friends and Family: Not on file    Frequency of Social Gatherings with Friends and Family: Not on file    Attends [employer] Services: Not on file    Active Member of [address] [place] [address] [employer] or Organizations: Not on file    Attends Club or Organization Meetings: Not on file    Marital Status: Not on file   Intimate Partner Violence:     Fear of Current or Ex-Partner: Not on file    Emotionally Abused: Not on file    Physically Abused: Not on file    Sexually Abused: Not on file   Housing Stability:     Unable to Pay for Housing in the Last Year: Not on file    Number of [place] in the Last Year: Not on file    Unstable Housing in the Last Year: Not on file       Past Surgical History:   Procedure Laterality Date    HX BREAST BIOPSY Right 2015    RIGHT BREAST EXCISIONAL BIOPSY W ULTRASOUND  performed by Annia Cummings MD at [address] HX BREAST BIOPSY Right     Surgical removal of benign tissue    HX BREAST REDUCTION Bilateral 2018    US GUIDED CORE BREAST BIOPSY Right     benign       Family History   Problem Relation Age of Onset    No Known Problems Mother     Diabetes Father     Heart Attack Father     No Known Problems Son     No Known Problems Daughter     No Known Problems Daughter     No Known Problems Daughter     No Known Problems Daughter         OB History        5    Para   5    Term   5       0    AB   0    Living   1       SAB   0    IAB   0    Ectopic   0    Molar   0    Multiple        Live Births   1          Obstetric Comments   Menarche:  14. LMP: 2014. # of Children:  4. Age at Delivery of First Child:  20.   Hysterectomy/oophorectomy:  NO/NO. Breast Bx:  yes. Hx of Breast Feeding:  yes. BCP:  yes. Hormone therapy:  no.                   REVIEW OF SYSTEMS:    Patient denies any recent fever, chills, nausea, vomiting, chest pain, abdominal pain, blurred vision, dizziness or shortness of breath. Vitals:    Ht 5' 2\" (1.575 m)   Wt 185 lb (83.9 kg)   BMI 33.84 kg/m²    Body mass index is 33.84 kg/m². PHYSICAL EXAM:    The patient is alert and oriented x3 and in no acute distress. Patient ambulates with a normal gait without gait aids. Sensory testing in all major nerve distributions in the lower extremities is intact and symmetric. Manual motor testing of the major muscle groups of the lower extremities including dorsiflexion/EHL/ plantarflexion, knee flexion/extension, hip flexion/extension/abduction/ adduction is intact and symmetric in the bilateral lower extremities. Deep tendon reflexes +2/4 and symmetric in the bilateral knees and ankles. Hip range of motion is pain-free bilaterally. Negative straight leg raise bilaterally.  No evidence of clonus bilaterally. Babinski's downgoing and symmetric bilaterally. Distal pulses intact and symmetric bilaterally. There is no tenderness to palpation of the thoracic, lumbar or sacral regions. IMAGING:    Results from Appointment encounter on 02/16/22    XR SPINE LUMB MIN 4 V    Narrative  AP, lateral, flexion and extension digital view radiographs of the lumbar spine obtained in the office today and was reviewed demonstrate good preservation of vertebral body height and intervertebral disc height. There are no gross motion segments seen on flexion/extension views. Overall lumbar alignment is lordotic. There is no evidence of fracture, lytic lesion or acute bony abnormality. MRI Results (most recent):  Results from [place] encounter on 02/25/22    MRI LUMB SPINE WO CONT    Narrative  EXAM: MRI LUMB SPINE WO CONT  History: Low back pain  INDICATION: . Low back pain, unspecified    COMPARISON: None    TECHNIQUE: MR imaging of the lumbar spine was performed using the following  sequences: sagittal T1, T2, STIR;  axial T1, T2.    CONTRAST:  None. FINDINGS:    There is normal alignment of the lumbar spine. Vertebral body heights are  maintained. Marrow signal is normal.    The conus medullaris terminates at T12/L1. Abdominal aorta is normal in caliber. Proximal common vessels are normal in caliber. . Signal and caliber of the distal  spinal cord are within normal limits. The paraspinal soft tissues are within normal limits. Lower thoracic spine: No herniation or stenosis. L1-L2: No herniation or stenosis. L2-L3: No herniation or stenosis. L3-L4: No herniation or stenosis. L4-L5: Disc desiccation. Mild facet arthropathy. Mild central protrusion with  annular fissure. The canal is patent. The foramina are patent. L5-S1: Minimal facet arthropathy. Mild central protrusion. Mild canal stenosis. Minimal left foraminal stenosis.     Impression  Mild degenerative changes with mild canal and minimal left foraminal stenosis at  L5-S1. No orders of the defined types were placed in this encounter. ASSESSMENT/PLAN:      1. Lumbar back pain  2. Annular tear of lumbar disc        Below is the assessment and plan developed based on review of pertinent history, physical exam, labs, studies, and medications. Have discussed the patient's diagnosis and radiographic findings at length and have answered all patient questions to her satisfaction. Have advised use of OTC NSAIDs and/or Tylenol on an as-needed basis. The patient has completed several weeks of outpatient physical therapy and is familiar with home exercises which I have encouraged her to do for core strengthening. Encourage patient to initiate a weight loss and walking program as tolerated. Patient has an annular tear which should resolve on its own with tincture of time. Has suggested the patient she may benefit from wearing a LSO brace for work for additional support. At this point patient is return to work without restrictions which is appropriate given her recent MRI findings. Will plan on seeing the patient back for reevaluation on an as-needed basis. The patient understands and agrees to the treatment plan as outlined above. Return if symptoms worsen or fail to improve. Dr. Jolie Mark was available for immediate consultation as needed. An electronic signature was used to authenticate this note.   -- Baljinder Wheat PA-C

## 2022-09-29 ENCOUNTER — OFFICE VISIT (OUTPATIENT)
Dept: FAMILY MEDICINE CLINIC | Age: 37
End: 2022-09-29
Payer: COMMERCIAL

## 2022-09-29 VITALS
WEIGHT: 187.2 LBS | SYSTOLIC BLOOD PRESSURE: 110 MMHG | BODY MASS INDEX: 34.45 KG/M2 | DIASTOLIC BLOOD PRESSURE: 74 MMHG | OXYGEN SATURATION: 97 % | RESPIRATION RATE: 16 BRPM | HEIGHT: 62 IN | HEART RATE: 89 BPM | TEMPERATURE: 98.4 F

## 2022-09-29 DIAGNOSIS — Z11.4 ENCOUNTER FOR SCREENING FOR HIV: ICD-10-CM

## 2022-09-29 DIAGNOSIS — Z11.59 ENCOUNTER FOR HEPATITIS C SCREENING TEST FOR LOW RISK PATIENT: ICD-10-CM

## 2022-09-29 DIAGNOSIS — G44.221 CHRONIC TENSION-TYPE HEADACHE, INTRACTABLE: ICD-10-CM

## 2022-09-29 DIAGNOSIS — Z01.419 WELL WOMAN EXAM: Primary | ICD-10-CM

## 2022-09-29 DIAGNOSIS — L29.9 PRURITUS: ICD-10-CM

## 2022-09-29 PROCEDURE — 99214 OFFICE O/P EST MOD 30 MIN: CPT

## 2022-09-29 PROCEDURE — 99385 PREV VISIT NEW AGE 18-39: CPT

## 2022-09-29 RX ORDER — TIZANIDINE 2 MG/1
2 TABLET ORAL
Qty: 8 TABLET | Refills: 0 | Status: SHIPPED | OUTPATIENT
Start: 2022-09-29

## 2022-09-29 RX ORDER — BISMUTH SUBSALICYLATE 262 MG
1 TABLET,CHEWABLE ORAL DAILY
Qty: 90 TABLET | Refills: 3 | Status: SHIPPED | OUTPATIENT
Start: 2022-09-29

## 2022-09-29 NOTE — PROGRESS NOTES
HPI:  Armani Page is a 39 y.o. female presenting for well woman exam. Noted to have a past medical history of headaches for 4 mo. She describes it as pressure in front of head and behind eyes. Tylenol and Ibuprofen helped for only an hour. She takes 1 or both of these medicines every day, sometimes as much as 3 times a day. She rates her headache today 6/10. Triggers include looking at phone for 5-10 min. 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 He has headaches only mildly interfere with her day-to-day job. Endorses mild photophobia. Denies nausea, vomiting. Patient also endorses a small region of itchiness on L hand for 10 days. Denies rash, changes in skin. No one else in the family endorses similar symptoms. She denies regular contact with water other than washing dishes for her family in the evenings. Patient otherwise denies fevers, chills, changes in vision, changes in hearing, chest pain, palpitations, shortness of breath, abdominal pain, nausea, vomiting, diarrhea, constipation, lower extremity edema, and numbness and tingling in extremities. Sexually active?:  Monogamous with   Diet: [place] diet: Chicken, meats, good amount of vegetables and fruits every day   Exercise: No current exercise regimen at this time    Immunizations - reviewed:   Immunization History   Administered Date(s) Administered    COVID-19, PFIZER PURPLE top, DILUTE for use, (age 15 y+), IM, 30mcg/0.3mL 11/08/2021, 11/29/2021    Influenza, FLUARIX, FLULAVAL, FLUZONE (age 10 mo+) AND AFLURIA, (age 1 y+), PF, 0.5mL 09/05/2019, 11/12/2020     Flu: denies at this time  Tdap: denies    Health Maintenance reviewed -  Pap smear 1 yr ago, [address] 1 yr ago.   History of multiple mammograms for benign breast tumor  HIV testing amenable to get today  Hepatitis C testing amenable to get today      Allergies- reviewed:   No Known Allergies      Medications- reviewed:   Current Outpatient Medications   Medication Sig    multivitamin (ONE A DAY) tablet Take 1 Tablet by mouth daily. tiZANidine (ZANAFLEX) 2 mg tablet Take 1 Tablet by mouth nightly. Dupixent Syringe 300 mg/2 mL syrg syringe     diclofenac EC (VOLTAREN) 75 mg EC tablet Take 1 Tablet by mouth two (2) times daily (with meals). Indications: PRN pain (Patient not taking: Reported on 9/29/2022)    methylPREDNISolone (MEDROL DOSEPACK) 4 mg tablet Per dose pack instructions (Patient not taking: Reported on 9/29/2022)    diclofenac EC (VOLTAREN) 75 mg EC tablet Take 1 Tablet by mouth two (2) times daily as needed for Pain. (Patient not taking: Reported on 9/29/2022)    lidocaine (Lidoderm) 5 % Apply patch to the affected area for 12 hours a day and remove for 12 hours a day. (Patient not taking: Reported on 9/29/2022)    methocarbamoL (Robaxin) 500 mg tablet Take 1 Tablet by mouth three (3) times daily as needed for Muscle Spasm(s) or Pain. (Patient not taking: Reported on 9/29/2022)     No current facility-administered medications for this visit.          Past Medical History- reviewed:  Past Medical History:   Diagnosis Date    Breast disorder     Breast mass, right 8/2015    Other hyperlipidemia 6/25/2020         Past Surgical History- reviewed:   Past Surgical History:   Procedure Laterality Date    HX BREAST BIOPSY Right 8/11/2015    RIGHT BREAST EXCISIONAL BIOPSY W ULTRASOUND  performed by Carlos Alberto Mueller MD at [facility]    HX BREAST BIOPSY Right 2015    Surgical removal of benign tissue    HX BREAST REDUCTION Bilateral 2018    US GUIDED CORE BREAST BIOPSY Right 2015    benign         Family History - reviewed:  Family History   Problem Relation Age of Onset    No Known Problems Mother     Diabetes Father     Heart Attack Father     No Known Problems Son     No Known Problems Daughter     No Known Problems Daughter     No Known Problems Daughter     No Known Problems Daughter          Social History - reviewed:  Social History     Socioeconomic History    Marital status:      Spouse name: Not on file    Number of children: Not on file    Years of education: Not on file    Highest education level: Not on file   Occupational History    Not on file   Tobacco Use    Smoking status: Never    Smokeless tobacco: Never   Vaping Use    Vaping Use: Never used   Substance and Sexual Activity    Alcohol use: Never    Drug use: Never    Sexual activity: Yes     Partners: Male     Birth control/protection: Injection, I.U.D. Comment:    Other Topics Concern    Not on file   Social History Narrative    ** Merged History Encounter **          Social Determinants of Health     Financial Resource Strain: Not on file   Food Insecurity: Not on file   Transportation Needs: Not on file   Physical Activity: Not on file   Stress: Not on file   Social Connections: Not on file   Intimate Partner Violence: Not on file   Housing Stability: Not on file           Review of Systems  Negative for 10 systems not otherwise mentioned in the HPI.       Physical Exam  Visit Vitals  /74 (BP 1 Location: Left upper arm, BP Patient Position: Sitting, BP Cuff Size: Large adult)   Pulse 89   Temp 98.4 °F (36.9 °C) (Oral)   Resp 16   Ht 5' 2\" (1.575 m)   Wt 187 lb 3.2 oz (84.9 kg)   LMP 09/28/2022   SpO2 97%   BMI 34.24 kg/m²       General appearance - alert, well appearing, and in no distress  Ears - bilateral TM's and external ear canals normal  Nose - normal and patent, no erythema, discharge or polyps  Mouth - mucous membranes moist, pharynx normal without lesions  Neck - supple, no significant adenopathy, thyroid exam: thyroid is normal in size without nodules or tenderness  Chest - clear to auscultation, no wheezes, rales or rhonchi, symmetric air entry  Heart - normal rate, regular rhythm, normal S1, S2, no murmurs, rubs, clicks or gallops  Abdomen - soft, nontender, nondistended, no masses or organomegaly  Neurological - alert, oriented, normal speech, no focal findings or movement disorder noted. Cranial nerves II through XII intact. No motor or sensory deficits. Musculoskeletal - no joint tenderness, deformity or swelling  Extremities - no pedal edema noted  Skin - normal coloration and turgor, no rashes, no suspicious skin lesions noted  Breast - deferred      Assessment/Plan:  Diagnoses and all orders for this visit:    1. Well woman exam  -     multivitamin (ONE A DAY) tablet; Take 1 Tablet by mouth daily.  -     CBC W/O DIFF; Future  -     METABOLIC PANEL, COMPREHENSIVE; Future  -     LIPID PANEL; Future  -     HEMOGLOBIN A1C WITH EAG; Future  -     HEPATITIS C AB; Future  -     HIV 1/2 AG/AB, 4TH GENERATION,W RFLX CONFIRM; Future  - Denied flu shot and Tdap at this time. 2. Chronic tension-type headache, intractable: History concerning for medication overuse headache vs tension type headache. Neuro exam reassuring.  -Trial of discontinuing multimodal pain control for 2 weeks  -    Bridge with tiZANidine (ZANAFLEX) 2 mg tablet; Take 1 Tablet by mouth nightly for 5 nights. Can increase to 2 tablets As needed  -Discussed trigger point injections as needed in the future. Denied at this time.  -Will follow-up in 2 weeks    3. Encounter for screening for HIV  -     HIV 1/2 AG/AB, 4TH GENERATION,W RFLX CONFIRM; Future    4. Encounter for hepatitis C screening test for low risk patient  -     HEPATITIS C AB; Future    5. Pruritus: Area of itching on left thenar eminence. Differential includes tinea, scabies, however both seem unlikely with lack of rash. Neuropathic or psychogenic itch on differential without primary skin lesion.  -Counseled on over-the-counter hydrocortisone trial if itchiness begins to greatly interfere with day to day life.  Counseled on side effects of steroids including atrophy and skin discoloration.  -We will watch and wait until next follow-up in 2 weeks        Follow-up and Dispositions    Return in about 2 weeks (around 10/13/2022). I have discussed the diagnosis with the patient and the intended plan as seen in the above orders. The patient has received an after-visit summary and questions were answered concerning future plans. I have discussed medication side effects and warnings with the patient as well. Informed pt to return to the office if new symptoms arise. Patient discussed with Dr. Malena Solano (attending)     Note is dictated utilizing voice recognition software. Unfortunately this leads to occasional typographical errors. I apologize in advance if the situation occurs. If questions occur please do not hesitate to call our office.      Kulwant Winston MD

## 2022-09-29 NOTE — PROGRESS NOTES
Rm    Chief Complaint   Patient presents with    New Patient     Establishing care        Visit Vitals  /74 (BP 1 Location: Left upper arm, BP Patient Position: Sitting, BP Cuff Size: Large adult)   Pulse 89   Temp 98.4 °F (36.9 °C) (Oral)   Resp 16   Ht 5' 2\" (1.575 m)   Wt 187 lb 3.2 oz (84.9 kg)   LMP 09/28/2022   SpO2 97%   BMI 34.24 kg/m²        1. Have you been to the ER, urgent care clinic since your last visit? Hospitalized since your last visit? No    2. Have you seen or consulted any other health care providers outside of the [address] [place] [address] since your last visit? Include any pap smears or colon screening. No     Health Maintenance Due   Topic Date Due    Hepatitis C Screening  Never done    DTaP/Tdap/Td series (1 - Tdap) Never done    Cervical cancer screen  08/14/2019    COVID-19 Vaccine (3 - Booster for Pfizer series) 04/29/2022    Flu Vaccine (1) 08/01/2022        3 most recent PHQ Screens 9/29/2022   Little interest or pleasure in doing things Not at all   Feeling down, depressed, irritable, or hopeless Not at all   Total Score PHQ 2 0        No flowsheet data found.     Learning Assessment 7/6/2015   PRIMARY LEARNER Patient   PRIMARY LANGUAGE OTHER (COMMENT)   LEARNER PREFERENCE PRIMARY OTHER (COMMENT)   ANSWERED BY patient   RELATIONSHIP SELF

## 2022-09-30 LAB
ALBUMIN SERPL-MCNC: 4 G/DL (ref 3.5–5)
ALBUMIN/GLOB SERPL: 1.1 {RATIO} (ref 1.1–2.2)
ALP SERPL-CCNC: 69 U/L (ref 45–117)
ALT SERPL-CCNC: 24 U/L (ref 12–78)
ANION GAP SERPL CALC-SCNC: 7 MMOL/L (ref 5–15)
AST SERPL-CCNC: 11 U/L (ref 15–37)
BILIRUB SERPL-MCNC: 0.3 MG/DL (ref 0.2–1)
BUN SERPL-MCNC: 14 MG/DL (ref 6–20)
BUN/CREAT SERPL: 18 (ref 12–20)
CALCIUM SERPL-MCNC: 9.7 MG/DL (ref 8.5–10.1)
CHLORIDE SERPL-SCNC: 106 MMOL/L (ref 97–108)
CHOLEST SERPL-MCNC: 260 MG/DL
CO2 SERPL-SCNC: 28 MMOL/L (ref 21–32)
CREAT SERPL-MCNC: 0.78 MG/DL (ref 0.55–1.02)
ERYTHROCYTE [DISTWIDTH] IN BLOOD BY AUTOMATED COUNT: 13.8 % (ref 11.5–14.5)
EST. AVERAGE GLUCOSE BLD GHB EST-MCNC: 114 MG/DL
GLOBULIN SER CALC-MCNC: 3.5 G/DL (ref 2–4)
GLUCOSE SERPL-MCNC: 80 MG/DL (ref 65–100)
HBA1C MFR BLD: 5.6 % (ref 4–5.6)
HCT VFR BLD AUTO: 45.5 % (ref 35–47)
HCV AB SERPL QL IA: NONREACTIVE
HDLC SERPL-MCNC: 55 MG/DL
HDLC SERPL: 4.7 {RATIO} (ref 0–5)
HGB BLD-MCNC: 14.2 G/DL (ref 11.5–16)
HIV 1+2 AB+HIV1 P24 AG SERPL QL IA: NONREACTIVE
HIV12 RESULT COMMENT, HHIVC: NORMAL
LDLC SERPL CALC-MCNC: 177.4 MG/DL (ref 0–100)
MCH RBC QN AUTO: 28.8 PG (ref 26–34)
MCHC RBC AUTO-ENTMCNC: 31.2 G/DL (ref 30–36.5)
MCV RBC AUTO: 92.3 FL (ref 80–99)
NRBC # BLD: 0 K/UL (ref 0–0.01)
NRBC BLD-RTO: 0 PER 100 WBC
PLATELET # BLD AUTO: 415 K/UL (ref 150–400)
PMV BLD AUTO: 10.3 FL (ref 8.9–12.9)
POTASSIUM SERPL-SCNC: 4.3 MMOL/L (ref 3.5–5.1)
PROT SERPL-MCNC: 7.5 G/DL (ref 6.4–8.2)
RBC # BLD AUTO: 4.93 M/UL (ref 3.8–5.2)
SODIUM SERPL-SCNC: 141 MMOL/L (ref 136–145)
TRIGL SERPL-MCNC: 138 MG/DL (ref ?–150)
VLDLC SERPL CALC-MCNC: 27.6 MG/DL
WBC # BLD AUTO: 10.6 K/UL (ref 3.6–11)

## 2022-10-06 NOTE — PROGRESS NOTES
HIV nonreactive, hep C nonreactive. A1c 5.6. Cholesterol elevated at 260, .4. CMP and CBC look good.

## 2023-01-01 NOTE — ADT AUTH CERT NOTES
Patient Demographics        Patient Name [address] Sex  Address Phone       Mohinder Somers [phone] Female 1985 [id number] [address] [phone] (Home)  [phone] (Mobile)           CSN:       [id number]           Admit Date: Admit Time Room Bed       Sep 12, 2017  6:09  [[id number]] 01 [[id number]]           Attending Providers        Provider Pager From To       Luther Bates MD  17       Joshua Hazel MD  17           Delivery Date: 2017   Delivery Time: 8:34 PM   Delivery Type: Vaginal, Spontaneous Delivery  Sex:  male    Gestational Age: 44w3d     [place] Measurements:  Birth Weight: 3.35 kg    Birth Length: 20.5\"          Delivery Clinician:  Shayy Mei?:   Delivery Location: L&D Yes

## 2023-03-14 ENCOUNTER — TELEPHONE (OUTPATIENT)
Dept: FAMILY MEDICINE CLINIC | Age: 38
End: 2023-03-14

## 2023-03-14 NOTE — TELEPHONE ENCOUNTER
Patient came into clinic requesting an appointment for sharp pain in her heart since last night. We only had night clinic appointments starting at 6:30pm. Spoke with my supervisor who stated that patient should go to the ER.

## 2023-05-17 NOTE — PROGRESS NOTES
Outbound call to patient at [phone] verified , informed her of x-ray results. Patient verbalized understanding. [Supple] : supple [No Supraclavicular Adenopathy] : no supraclavicular adenopathy [No Cervical Adenopathy] : no cervical adenopathy [Examined in the supine and seated position] : examined in the supine and seated position [No dominant masses] : no dominant masses in right breast  [No dominant masses] : no dominant masses left breast [No Nipple Retraction] : no left nipple retraction [No Nipple Discharge] : no left nipple discharge [No Axillary Lymphadenopathy] : no left axillary lymphadenopathy [de-identified] : dense [de-identified] : dense UOQ [de-identified] : well-healed periareolar incision with no erythema, drainage, or dehiscence

## 2023-08-25 ENCOUNTER — OFFICE VISIT (OUTPATIENT)
Age: 38
End: 2023-08-25
Payer: COMMERCIAL

## 2023-08-25 VITALS
BODY MASS INDEX: 34.65 KG/M2 | HEART RATE: 81 BPM | HEIGHT: 62 IN | RESPIRATION RATE: 18 BRPM | TEMPERATURE: 98.2 F | OXYGEN SATURATION: 97 % | SYSTOLIC BLOOD PRESSURE: 106 MMHG | DIASTOLIC BLOOD PRESSURE: 66 MMHG | WEIGHT: 188.27 LBS

## 2023-08-25 DIAGNOSIS — G57.53 TARSAL TUNNEL SYNDROME OF BOTH LOWER EXTREMITIES: ICD-10-CM

## 2023-08-25 DIAGNOSIS — M79.672 PAIN OF BOTH HEELS: Primary | ICD-10-CM

## 2023-08-25 DIAGNOSIS — M79.671 PAIN OF BOTH HEELS: Primary | ICD-10-CM

## 2023-08-25 PROCEDURE — 99213 OFFICE O/P EST LOW 20 MIN: CPT | Performed by: FAMILY MEDICINE

## 2023-08-25 SDOH — ECONOMIC STABILITY: INCOME INSECURITY: HOW HARD IS IT FOR YOU TO PAY FOR THE VERY BASICS LIKE FOOD, HOUSING, MEDICAL CARE, AND HEATING?: NOT HARD AT ALL

## 2023-08-25 SDOH — ECONOMIC STABILITY: HOUSING INSECURITY
IN THE LAST 12 MONTHS, WAS THERE A TIME WHEN YOU DID NOT HAVE A STEADY PLACE TO SLEEP OR SLEPT IN A SHELTER (INCLUDING NOW)?: NO

## 2023-08-25 SDOH — ECONOMIC STABILITY: FOOD INSECURITY: WITHIN THE PAST 12 MONTHS, YOU WORRIED THAT YOUR FOOD WOULD RUN OUT BEFORE YOU GOT MONEY TO BUY MORE.: NEVER TRUE

## 2023-08-25 SDOH — ECONOMIC STABILITY: FOOD INSECURITY: WITHIN THE PAST 12 MONTHS, THE FOOD YOU BOUGHT JUST DIDN'T LAST AND YOU DIDN'T HAVE MONEY TO GET MORE.: NEVER TRUE

## 2023-08-25 ASSESSMENT — PATIENT HEALTH QUESTIONNAIRE - PHQ9
SUM OF ALL RESPONSES TO PHQ QUESTIONS 1-9: 0
1. LITTLE INTEREST OR PLEASURE IN DOING THINGS: 0
2. FEELING DOWN, DEPRESSED OR HOPELESS: 0
SUM OF ALL RESPONSES TO PHQ QUESTIONS 1-9: 0
SUM OF ALL RESPONSES TO PHQ QUESTIONS 1-9: 0
SUM OF ALL RESPONSES TO PHQ9 QUESTIONS 1 & 2: 0
SUM OF ALL RESPONSES TO PHQ QUESTIONS 1-9: 0

## 2023-09-12 ENCOUNTER — OFFICE VISIT (OUTPATIENT)
Age: 38
End: 2023-09-12
Payer: COMMERCIAL

## 2023-09-12 VITALS
DIASTOLIC BLOOD PRESSURE: 68 MMHG | SYSTOLIC BLOOD PRESSURE: 102 MMHG | HEIGHT: 62 IN | TEMPERATURE: 98 F | BODY MASS INDEX: 34.44 KG/M2 | RESPIRATION RATE: 14 BRPM | HEART RATE: 82 BPM | OXYGEN SATURATION: 97 %

## 2023-09-12 DIAGNOSIS — M79.671 HEEL PAIN, BILATERAL: Primary | ICD-10-CM

## 2023-09-12 DIAGNOSIS — M79.672 HEEL PAIN, BILATERAL: Primary | ICD-10-CM

## 2023-09-12 PROCEDURE — 99213 OFFICE O/P EST LOW 20 MIN: CPT

## 2023-09-12 NOTE — PROGRESS NOTES
Nancie Marx is a 44 y.o. female    Chief Complaint   Patient presents with    Foot Pain       1. Have you been to the ER, urgent care clinic since your last visit? Hospitalized since your last visit?no    2. Have you seen or consulted any other health care providers outside of the [address] [place] [address] since your last visit? Include any pap smears or colon screening. no    There were no vitals filed for this visit.        No data to display              Health Maintenance Due   Topic Date Due    Hepatitis B vaccine (1 of 3 - 3-dose series) Never done    Varicella vaccine (1 of 2 - 2-dose childhood series) Never done    DTaP/Tdap/Td vaccine (1 - Tdap) Never done    Cervical cancer screen  08/14/2019    COVID-19 Vaccine (3 - Pfizer series) 01/24/2022    Flu vaccine (1) 08/01/2023

## 2023-09-12 NOTE — PROGRESS NOTES
Chief Complaint   Patient presents with    Foot Pain      Subjective   Basil Herrera is an 44 y.o. female who presents for foot pain. Seen last yfn for this problem and advised proper athletic foot wear. Patient tried wearing tennis shoes but says this made the pain worse. Works for [employer] and is on her feet all day. Symptoms get worse throughout the day and improve with rest.     Review of Systems   Review of Systems See HPI    Allergies   No Known Allergies    Medications  Current Outpatient Medications   Medication Sig    dupilumab (DUPIXENT) 300 MG/2ML SOSY injection ceived the following from [facility]: Outside name: Dupixent Syringe 300 mg/2 mL syrg syringe    diclofenac (VOLTAREN) 75 MG EC tablet Take 75 mg by mouth 2 times daily (with meals) (Patient not taking: Reported on 8/25/2023)    lidocaine (LIDODERM) 5 % Apply patch to the affected area for 12 hours a day and remove for 12 hours a day. (Patient not taking: Reported on 8/25/2023)    methocarbamol (ROBAXIN) 500 MG tablet Take 500 mg by mouth 3 times daily as needed (Patient not taking: Reported on 8/25/2023)    methylPREDNISolone (MEDROL DOSEPACK) 4 MG tablet Per dose pack instructions (Patient not taking: Reported on 8/25/2023)    tiZANidine (ZANAFLEX) 2 MG tablet Take 1 tablet by mouth nightly (Patient not taking: Reported on 8/25/2023)     No current facility-administered medications for this visit.        Medical History  Past Medical History:   Diagnosis Date    Breast disorder     Breast mass, right 8/2015    Other hyperlipidemia 6/25/2020       Immunizations   Immunization History   Administered Date(s) Administered    COVID-19, PFIZER PURPLE top, DILUTE for use, (age 15 y+), 30mcg/0.3mL 11/08/2021, 11/29/2021    Influenza, FLUARIX, FLULAVAL, FLUZONE (age 10 mo+) AND AFLURIA, (age 1 y+), PF, 0.5mL 09/05/2019, 11/12/2020       Objective   Vital Signs  /68 (Site: Right Upper Arm, Position: Sitting, Cuff Size: Medium

## 2023-12-13 ENCOUNTER — TELEPHONE (OUTPATIENT)
Age: 38
End: 2023-12-13

## 2023-12-13 NOTE — TELEPHONE ENCOUNTER
----- Message from Kusum Ni sent at 12/1/2023 12:05 PM EST -----  Subject: Appointment Request    Reason for Call: Established Patient Appointment needed: Semi-Routine   Cough, Cold Symptoms    QUESTIONS    Reason for appointment request? No appointments available during search     Additional Information for Provider? Patient has cough and sore throat   since today.  Asking to schedule a virtual or in person appt.   ---------------------------------------------------------------------------  --------------  Raad Alcocer MITCH  [phone]; OK to leave message on voicemail  ---------------------------------------------------------------------------  --------------  SCRIPT ANSWERS

## 2024-01-11 ENCOUNTER — OFFICE VISIT (OUTPATIENT)
Age: 39
End: 2024-01-11
Payer: COMMERCIAL

## 2024-01-11 VITALS
DIASTOLIC BLOOD PRESSURE: 73 MMHG | HEIGHT: 62 IN | RESPIRATION RATE: 18 BRPM | WEIGHT: 188.4 LBS | TEMPERATURE: 98 F | SYSTOLIC BLOOD PRESSURE: 107 MMHG | BODY MASS INDEX: 34.67 KG/M2 | HEART RATE: 86 BPM | OXYGEN SATURATION: 97 %

## 2024-01-11 DIAGNOSIS — M79.672 HEEL PAIN, BILATERAL: Primary | ICD-10-CM

## 2024-01-11 DIAGNOSIS — M79.671 HEEL PAIN, BILATERAL: Primary | ICD-10-CM

## 2024-01-11 PROCEDURE — 99213 OFFICE O/P EST LOW 20 MIN: CPT

## 2024-01-11 RX ORDER — MELOXICAM 15 MG/1
15 TABLET ORAL DAILY
Qty: 30 TABLET | Refills: 0 | Status: SHIPPED | OUTPATIENT
Start: 2024-01-11

## 2024-01-11 SDOH — ECONOMIC STABILITY: FOOD INSECURITY: WITHIN THE PAST 12 MONTHS, YOU WORRIED THAT YOUR FOOD WOULD RUN OUT BEFORE YOU GOT MONEY TO BUY MORE.: PATIENT DECLINED

## 2024-01-11 SDOH — ECONOMIC STABILITY: FOOD INSECURITY: WITHIN THE PAST 12 MONTHS, THE FOOD YOU BOUGHT JUST DIDN'T LAST AND YOU DIDN'T HAVE MONEY TO GET MORE.: PATIENT DECLINED

## 2024-01-11 SDOH — ECONOMIC STABILITY: HOUSING INSECURITY
IN THE LAST 12 MONTHS, WAS THERE A TIME WHEN YOU DID NOT HAVE A STEADY PLACE TO SLEEP OR SLEPT IN A SHELTER (INCLUDING NOW)?: PATIENT DECLINED

## 2024-01-11 SDOH — ECONOMIC STABILITY: INCOME INSECURITY: HOW HARD IS IT FOR YOU TO PAY FOR THE VERY BASICS LIKE FOOD, HOUSING, MEDICAL CARE, AND HEATING?: PATIENT DECLINED

## 2024-01-11 ASSESSMENT — PATIENT HEALTH QUESTIONNAIRE - PHQ9
SUM OF ALL RESPONSES TO PHQ9 QUESTIONS 1 & 2: 0
SUM OF ALL RESPONSES TO PHQ QUESTIONS 1-9: 0
1. LITTLE INTEREST OR PLEASURE IN DOING THINGS: 0

## 2024-01-11 NOTE — PROGRESS NOTES
Chief Complaint   Patient presents with    Follow-up Chronic Condition     Feet are still hurting. Pain has gotten worse. Pain score: 10, especially when she walks.      Vitals:    01/11/24 1148   BP: 107/73   Site: Right Upper Arm   Position: Sitting   Cuff Size: Large Adult   Pulse: 86   Resp: 18   Temp: 98 °F (36.7 °C)   TempSrc: Temporal   SpO2: 97%   Weight: 85.5 kg (188 lb 6.4 oz)   Height: 1.575 m (5' 2\")     1. Have you been to the ER, urgent care clinic since your last visit?  Hospitalized since your last visit?No    2. Have you seen or consulted any other health care providers outside of the [facility] since your last visit?  Include any pap smears or colon screening. No

## 2024-01-23 NOTE — PROGRESS NOTES
I reviewed with the resident the medical history and the resident's findings on the physical examination.  I discussed with the resident the patient's diagnosis and concur with the plan.     Azalia Velazco MD 1/23/2024

## 2024-01-23 NOTE — PROGRESS NOTES
Chief Complaint   Patient presents with    Follow-up Chronic Condition     Feet are still hurting. Pain has gotten worse. Pain score: 10, especially when she walks.       Subjective   Maria D Clemens is an 38 y.o. female who presents for foot pain follow up.     9/12/23: Seen last month for this problem and advised proper athletic foot wear. Patient tried wearing tennis shoes but says this made the pain worse. Works for [employer] and is on her feet all day. Symptoms get worse throughout the day and improve with rest.     1/11/24: Follow up. Continues to have bilateral heel pain. Has tried appropriate tennis shoes with added insoles and has not had relief. Needing to take motrin/tylenol prn. Still working with [employer].        Review of Systems   Review of Systems See HPI    Allergies   No Known Allergies    Medications  Current Outpatient Medications   Medication Sig    meloxicam (MOBIC) 15 MG tablet Take 1 tablet by mouth daily    diclofenac sodium (VOLTAREN) 1 % GEL Apply 4 g topically 4 times daily (Patient not taking: Reported on 1/11/2024)    diclofenac (VOLTAREN) 75 MG EC tablet Take 75 mg by mouth 2 times daily (with meals) (Patient not taking: Reported on 8/25/2023)    dupilumab (DUPIXENT) 300 MG/2ML SOSY injection ceived the following from [facility]: Outside name: Dupixent Syringe 300 mg/2 mL syrg syringe (Patient not taking: Reported on 1/11/2024)    lidocaine (LIDODERM) 5 % Apply patch to the affected area for 12 hours a day and remove for 12 hours a day. (Patient not taking: Reported on 8/25/2023)    methocarbamol (ROBAXIN) 500 MG tablet Take 500 mg by mouth 3 times daily as needed (Patient not taking: Reported on 8/25/2023)    methylPREDNISolone (MEDROL DOSEPACK) 4 MG tablet Per dose pack instructions (Patient not taking: Reported on 8/25/2023)    tiZANidine (ZANAFLEX) 2 MG tablet Take 1 tablet by mouth nightly (Patient not taking: Reported on 8/25/2023)     No current

## 2024-02-14 ENCOUNTER — OFFICE VISIT (OUTPATIENT)
Age: 39
End: 2024-02-14
Payer: COMMERCIAL

## 2024-02-14 VITALS
BODY MASS INDEX: 34.6 KG/M2 | WEIGHT: 188 LBS | DIASTOLIC BLOOD PRESSURE: 77 MMHG | OXYGEN SATURATION: 97 % | HEIGHT: 62 IN | SYSTOLIC BLOOD PRESSURE: 123 MMHG | HEART RATE: 77 BPM | TEMPERATURE: 97.2 F

## 2024-02-14 DIAGNOSIS — M72.2 PLANTAR FASCIITIS: Primary | ICD-10-CM

## 2024-02-14 PROCEDURE — 99204 OFFICE O/P NEW MOD 45 MIN: CPT | Performed by: PODIATRIST

## 2024-02-14 RX ORDER — NAPROXEN 500 MG/1
500 TABLET ORAL 2 TIMES DAILY WITH MEALS
Qty: 60 TABLET | Refills: 0 | Status: SHIPPED | OUTPATIENT
Start: 2024-02-14 | End: 2024-03-15

## 2024-02-14 NOTE — PATIENT INSTRUCTIONS
Plantar Fasciitis: Exercises      Introduction  Here are some examples of exercises for you to try. The exercises may be suggested for a condition or for rehabilitation. Start each exercise slowly. Ease off the exercises if you start to have pain.  You will be told when to start these exercises and which ones will work best for you.  How to do the exercises    Towel stretch    Sit with your legs extended and knees straight.  Place a towel around your foot just under the toes.  Hold each end of the towel in each hand, with your hands above your knees.  Pull back with the towel so that your foot stretches toward you.  Hold the position for at least 15 to 30 seconds.  Repeat 2 to 4 times a session, up to 5 sessions a day.    Calf stretch    This exercise stretches the muscles at the back of the lower leg (the calf) and the Achilles tendon. Do this exercise 3 or 4 times a day, 5 days a week.  Stand facing a wall with your hands on the wall at about eye level. Put the leg you want to stretch about a step behind your other leg.  Keeping your back heel on the floor, bend your front knee until you feel a stretch in the back leg.  Hold the stretch for 15 to 30 seconds. Repeat 2 to 4 times.    Plantar fascia and calf stretch    Stretching the plantar fascia and calf muscles can increase flexibility and decrease heel pain. You can do this exercise several times each day and before and after activity.  Stand on a step as shown above. Be sure to hold on to the banister.  Slowly let your heels down over the edge of the step as you relax your calf muscles. You should feel a gentle stretch across the bottom of your foot and up the back of your leg to your knee.  Hold the stretch about 15 to 30 seconds, and then tighten your calf muscle a little to bring your heel back up to the level of the step. Repeat 2 to 4 times.      Follow-up care is a key part of your treatment and safety. Be sure to make and go to all appointments, and call

## 2024-02-22 ENCOUNTER — HOSPITAL ENCOUNTER (OUTPATIENT)
Facility: HOSPITAL | Age: 39
Discharge: HOME | End: 2024-02-22
Payer: COMMERCIAL

## 2024-02-22 DIAGNOSIS — M72.2 PLANTAR FASCIITIS: ICD-10-CM

## 2024-02-22 PROCEDURE — 73630 X-RAY EXAM OF FOOT: CPT

## 2024-03-04 ASSESSMENT — ENCOUNTER SYMPTOMS
SHORTNESS OF BREATH: 0
DIARRHEA: 0
ABDOMINAL PAIN: 0
VOMITING: 0

## 2024-03-04 NOTE — PROGRESS NOTES
Chief Complaint   Patient presents with    New Patient    Foot Pain     /77 (Site: Left Upper Arm, Position: Sitting, Cuff Size: Medium Adult)   Pulse 77   Temp 97.2 °F (36.2 °C) (Temporal)   Ht 1.575 m (5' 2\")   Wt 85.3 kg (188 lb)   SpO2 97%   BMI 34.39 kg/m²     1. Have you been to the ER, urgent care clinic since your last visit?  Hospitalized since your last visit?No    2. Have you seen or consulted any other health care providers outside of the [facility] since your last visit?  Include any pap smears or colon screening. No    
palpable to B/L LE. CFT<3sec to all digits of B/L LE. Pedal hair growth noted to the level of the digits for B/L LE. Skin temp is warm to warm from proximal to distal for B/L LE. Neg homans/ryder signs to B/L LE. No varicosities or telangectasias noted to B/L LE.  NEURO: Protective and epicritic sensations grossly intact to B/L LE  MSK: Pain on palpation to right heel.  No gross deformities. Good muscle tone and bulk noted to B/L LE.  PSYCH: Cooperative with normal mood and affect     Assessment:      Diagnosis Orders   1. Plantar fasciitis  XR FOOT RIGHT (MIN 3 VIEWS)           Plan:     Patient seen and evaluated in the office.  Educated patient on different conservative treatments of her right foot consisting of over-the-counter orthotics, shoe gear, night splint.  Discussed with patient also about possible cortisone injection to the heel.  Dispensed stretching exercises for patient to home.  Also discussed with conservative treatments fail then I recommend a plantar fasciotomy.  Rx naproxen 5 mg take as needed for pain  Rx x-ray right foot    Return in about 1 month (around 3/14/2024).       Quentin Low DPM, CWSP, AACFAS  [facility]  [address] [place] [id number]  O: [phone]  F: [phone]

## 2024-03-14 ENCOUNTER — OFFICE VISIT (OUTPATIENT)
Age: 39
End: 2024-03-14
Payer: COMMERCIAL

## 2024-03-14 VITALS
BODY MASS INDEX: 34.08 KG/M2 | DIASTOLIC BLOOD PRESSURE: 77 MMHG | HEART RATE: 71 BPM | OXYGEN SATURATION: 98 % | HEIGHT: 62 IN | TEMPERATURE: 97.7 F | RESPIRATION RATE: 16 BRPM | SYSTOLIC BLOOD PRESSURE: 114 MMHG | WEIGHT: 185.2 LBS

## 2024-03-14 DIAGNOSIS — M72.2 PLANTAR FASCIITIS: Primary | ICD-10-CM

## 2024-03-14 PROCEDURE — 99213 OFFICE O/P EST LOW 20 MIN: CPT | Performed by: PODIATRIST

## 2024-03-14 RX ORDER — LEVONORGESTREL 52 MG/1
1 INTRAUTERINE DEVICE INTRAUTERINE ONCE
COMMUNITY

## 2024-03-14 RX ORDER — METHYLPREDNISOLONE 4 MG/1
TABLET ORAL
Qty: 1 KIT | Refills: 0 | Status: SHIPPED | OUTPATIENT
Start: 2024-03-14 | End: 2024-03-20

## 2024-03-14 ASSESSMENT — PATIENT HEALTH QUESTIONNAIRE - PHQ9
SUM OF ALL RESPONSES TO PHQ QUESTIONS 1-9: 0
SUM OF ALL RESPONSES TO PHQ9 QUESTIONS 1 & 2: 0
1. LITTLE INTEREST OR PLEASURE IN DOING THINGS: NOT AT ALL
SUM OF ALL RESPONSES TO PHQ QUESTIONS 1-9: 0
2. FEELING DOWN, DEPRESSED OR HOPELESS: NOT AT ALL

## 2024-03-14 NOTE — PROGRESS NOTES
Identified pt with two pt identifiers (name and ). Reviewed chart in preparation for visit and have obtained necessary documentation.    Maria D Clemens is a 38 y.o. female  Chief Complaint   Patient presents with    Follow-up    Foot Pain     Patient here to follow up on right heel pain. Patient states her pain is the same.      /77 (Site: Left Upper Arm, Position: Sitting)   Pulse 71   Temp 97.7 °F (36.5 °C) (Oral)   Resp 16   Ht 1.575 m (5' 2\")   Wt 84 kg (185 lb 3.2 oz)   LMP 2024 (Exact Date)   SpO2 98%   BMI 33.87 kg/m²     1. Have you been to the ER, urgent care clinic since your last visit?  Hospitalized since your last visit?no    2. Have you seen or consulted any other health care providers outside of the [facility] since your last visit?  Include any pap smears or colon screening. no

## 2024-07-23 ENCOUNTER — OFFICE VISIT (OUTPATIENT)
Age: 39
End: 2024-07-23
Payer: COMMERCIAL

## 2024-07-23 VITALS
HEART RATE: 85 BPM | RESPIRATION RATE: 16 BRPM | OXYGEN SATURATION: 97 % | SYSTOLIC BLOOD PRESSURE: 112 MMHG | DIASTOLIC BLOOD PRESSURE: 76 MMHG | BODY MASS INDEX: 33.16 KG/M2 | HEIGHT: 62 IN | TEMPERATURE: 98.2 F | WEIGHT: 180.2 LBS

## 2024-07-23 DIAGNOSIS — J02.9 SORE THROAT: ICD-10-CM

## 2024-07-23 DIAGNOSIS — J02.0 STREP THROAT: Primary | ICD-10-CM

## 2024-07-23 LAB
GROUP A STREP ANTIGEN, POC: POSITIVE
INFLUENZA A ANTIGEN, POC: NEGATIVE
INFLUENZA B ANTIGEN, POC: NEGATIVE
VALID INTERNAL CONTROL, POC: NORMAL
VALID INTERNAL CONTROL, POC: NORMAL

## 2024-07-23 PROCEDURE — 87804 INFLUENZA ASSAY W/OPTIC: CPT

## 2024-07-23 PROCEDURE — 87880 STREP A ASSAY W/OPTIC: CPT

## 2024-07-23 PROCEDURE — 99213 OFFICE O/P EST LOW 20 MIN: CPT

## 2024-07-23 RX ORDER — OXYMETAZOLINE HYDROCHLORIDE 0.05 G/100ML
2 SPRAY NASAL 2 TIMES DAILY
Qty: 15 ML | Refills: 0 | Status: SHIPPED | OUTPATIENT
Start: 2024-07-23 | End: 2024-07-26

## 2024-07-23 RX ORDER — FLUTICASONE PROPIONATE 50 MCG
2 SPRAY, SUSPENSION (ML) NASAL DAILY
Qty: 16 G | Refills: 0 | Status: SHIPPED | OUTPATIENT
Start: 2024-07-23

## 2024-07-23 RX ORDER — BENZONATATE 100 MG/1
100 CAPSULE ORAL 3 TIMES DAILY PRN
Qty: 30 CAPSULE | Refills: 0 | Status: SHIPPED | OUTPATIENT
Start: 2024-07-23 | End: 2024-08-02

## 2024-07-23 RX ORDER — AMOXICILLIN 500 MG/1
500 CAPSULE ORAL 2 TIMES DAILY
Qty: 20 CAPSULE | Refills: 0 | Status: SHIPPED | OUTPATIENT
Start: 2024-07-23 | End: 2024-08-02

## 2024-07-23 RX ORDER — ALBUTEROL SULFATE 90 UG/1
2 AEROSOL, METERED RESPIRATORY (INHALATION) 4 TIMES DAILY PRN
Qty: 18 G | Refills: 0 | Status: SHIPPED | OUTPATIENT
Start: 2024-07-23

## 2024-07-23 NOTE — PROGRESS NOTES
Identified pt with two pt identifiers(name and ). Reviewed record in preparation for visit and have obtained necessary documentation.  Chief Complaint   Patient presents with    Pharyngitis    Cough    Nasal Congestion   Pt states started 10 days ago.      Health Maintenance Due   Topic    Hepatitis B vaccine (1 of 3 - 3-dose series)    Varicella vaccine (1 of 2 - 2-dose childhood series)    DTaP/Tdap/Td vaccine (1 - Tdap)    Cervical cancer screen     COVID-19 Vaccine (3 - 2023-24 season)       Vitals:    24 1531   BP: 112/76   Site: Right Upper Arm   Position: Sitting   Cuff Size: Medium Adult   Pulse: 85   Resp: 16   Temp: 98.2 °F (36.8 °C)   TempSrc: Oral   SpO2: 97%   Weight: 81.7 kg (180 lb 3.2 oz)   Height: 1.575 m (5' 2\")         \"Have you been to the ER, urgent care clinic since your last visit?  Hospitalized since your last visit?\"    NO    “Have you seen or consulted any other health care providers outside of [facility] since your last visit?”    NO     “Have you had a pap smear?”    NO    Date of last Cervical Cancer screen (HPV or PAP): 2014             Click Here for Release of Records Request     This patient is accompanied in the office by her self.  I have received verbal consent from Maria D Clemens to discuss any/all medical information while they are present in the room.  
Maintenance:  Health Maintenance Due   Topic Date Due    Hepatitis B vaccine (1 of 3 - 3-dose series) Never done    Varicella vaccine (1 of 2 - 2-dose childhood series) Never done    DTaP/Tdap/Td vaccine (1 - Tdap) Never done    Cervical cancer screen  08/14/2019    COVID-19 Vaccine (3 - 2023-24 season) 09/01/2023      ROS:   Patient otherwise denies changes in vision, changes in hearing, chest pain, palpitations, shortness of breath, abdominal pain, vomiting, diarrhea, constipation, lower extremity edema, and numbness and tingling in extremities.     Past medical history, social history, and medications personally reviewed.  Past Medical History:   Diagnosis Date    Breast disorder     Breast mass, right 8/2015    Other hyperlipidemia 6/25/2020        Allergies personally reviewed.  No Known Allergies   Objective:   Vitals reviewed.  /76 (Site: Right Upper Arm, Position: Sitting, Cuff Size: Medium Adult)   Pulse 85   Temp 98.2 °F (36.8 °C) (Oral)   Resp 16   Ht 1.575 m (5' 2\")   Wt 81.7 kg (180 lb 3.2 oz)   SpO2 97%   BMI 32.96 kg/m²    Physical Exam    Vitals Reviewed.    General  HEENT AO x 3. No distress. Not diaphoretic. No jaundice. No cyanosis. No pallor.  Mild pharyngeal erythema, no tonsillar swelling or exudate, TMs nonerythematous, nonbulging bilaterally-cone of light present bilaterally   Neck No thyromegaly present. No JVD. No cervical adenopathy.    Cardio Normal rate, regular rhythm. No murmur, rubs, or gallop.    Pulmonary Effort normal. No accessory muscle use. No wheezes, rales, or rhonchi.     Abdominal Soft. Bowel sounds normal. No tenderness. No distension.    Extremities No edema of lower extremities. Pulses 2+.     Neurological CN II-XII grossly intact. No focal deficits.   Skin No rash.      Pt was discussed with Dr. Cavazos (attending physician).    I have reviewed pertinent labs results and other data. I have discussed the diagnosis with the patient and the intended plan as

## 2024-08-05 ENCOUNTER — OFFICE VISIT (OUTPATIENT)
Age: 39
End: 2024-08-05
Payer: COMMERCIAL

## 2024-08-05 VITALS
TEMPERATURE: 98 F | HEIGHT: 62 IN | RESPIRATION RATE: 18 BRPM | DIASTOLIC BLOOD PRESSURE: 75 MMHG | WEIGHT: 180 LBS | SYSTOLIC BLOOD PRESSURE: 112 MMHG | BODY MASS INDEX: 33.13 KG/M2 | HEART RATE: 90 BPM | OXYGEN SATURATION: 96 %

## 2024-08-05 DIAGNOSIS — J02.9 SORE THROAT: ICD-10-CM

## 2024-08-05 DIAGNOSIS — R05.9 COUGH, UNSPECIFIED TYPE: Primary | ICD-10-CM

## 2024-08-05 LAB
GROUP A STREP ANTIGEN, POC: POSITIVE
VALID INTERNAL CONTROL, POC: YES

## 2024-08-05 PROCEDURE — 87880 STREP A ASSAY W/OPTIC: CPT

## 2024-08-05 PROCEDURE — 99213 OFFICE O/P EST LOW 20 MIN: CPT

## 2024-08-05 RX ORDER — GUAIFENESIN/DEXTROMETHORPHAN 100-10MG/5
5 SYRUP ORAL NIGHTLY PRN
Qty: 120 ML | Refills: 0 | Status: SHIPPED | OUTPATIENT
Start: 2024-08-05 | End: 2024-08-29

## 2024-08-05 NOTE — PROGRESS NOTES
Room # 19    Patient has been identified by name and .    Chief Complaint   Patient presents with    Pharyngitis     Pt reports with sore throat & cough, runny nose for last 10 days       Vitals:    24 1921   BP: 112/75   Site: Right Upper Arm   Position: Sitting   Cuff Size: Large Adult   Pulse: 90   Resp: 18   Temp: 98 °F (36.7 °C)   TempSrc: Oral   SpO2: 96%   Weight: 81.6 kg (180 lb)   Height: 1.575 m (5' 2\")        \"Have you been to the ER, urgent care clinic since your last visit?  Hospitalized since your last visit?\"    NO    “Have you seen or consulted any other health care providers outside of [facility] since your last visit?”    NO        “Have you had a pap smear?”    NO    Date of last Cervical Cancer screen (HPV or PAP): 2014

## 2024-08-06 NOTE — PROGRESS NOTES
[id number] [id number]    Office [phone], Fax [phone]        Subjective   Maria D Clemens is a 38 y.o. female who presents for Pharyngitis (Pt reports with sore throat & cough, runny nose for last 10 days)    Soar throat  - Since 10 days, soar throat, cough, runny nose. Hazel with swallowing. Feels like she has swollen lymph nodes.   - Was recently diagnosed with Strep pharyngitis, got amoxicillin 10d. Finished 2 days ago. Pt feels like the cough is getting worse since she finished the antibiotics. Currently has only a little bit pain in throat, but is disturbed by the cough.   - No other sick contacts. No Fevers, no SOB.     Review of Systems   Pertinent results per HPI.     Medical History  Past Medical History:   Diagnosis Date    Breast disorder     Breast mass, right 8/2015    Other hyperlipidemia 6/25/2020       Medications  Current Outpatient Medications   Medication Sig    guaiFENesin-dextromethorphan (ROBITUSSIN DM) 100-10 MG/5ML syrup Take 5 mLs by mouth nightly as needed for Cough    albuterol sulfate HFA (VENTOLIN HFA) 108 (90 Base) MCG/ACT inhaler Inhale 2 puffs into the lungs 4 times daily as needed for Wheezing    levonorgestrel (MIRENA, 52 MG,) IUD 52 mg 1 each by IntraUTERine route once    dupilumab (DUPIXENT) 300 MG/2ML SOSY injection Inject 2 mLs into the skin every 14 days    guaiFENesin (ROBITUSSIN) 100 MG/5ML syrup Take 10 mLs by mouth 3 times daily as needed for Cough (Patient not taking: Reported on 8/5/2024)    fluticasone (FLONASE) 50 MCG/ACT nasal spray 2 sprays by Each Nostril route daily (Patient not taking: Reported on 8/5/2024)    naproxen (NAPROSYN) 500 MG tablet Take 1 tablet by mouth 2 times daily (with meals) (Patient not taking: Reported on 3/14/2024)    meloxicam (MOBIC) 15 MG tablet Take 1 tablet by mouth daily (Patient not taking: Reported on 2/14/2024)    diclofenac sodium (VOLTAREN) 1 % GEL Apply 4 g topically 4 times daily (Patient not

## 2024-09-04 ENCOUNTER — OFFICE VISIT (OUTPATIENT)
Age: 39
End: 2024-09-04

## 2024-09-04 VITALS
DIASTOLIC BLOOD PRESSURE: 73 MMHG | BODY MASS INDEX: 33.13 KG/M2 | HEIGHT: 62 IN | WEIGHT: 180 LBS | SYSTOLIC BLOOD PRESSURE: 109 MMHG | TEMPERATURE: 97.9 F | OXYGEN SATURATION: 98 % | RESPIRATION RATE: 18 BRPM | HEART RATE: 77 BPM

## 2024-09-04 DIAGNOSIS — J02.0 ACUTE STREPTOCOCCAL PHARYNGITIS: ICD-10-CM

## 2024-09-04 DIAGNOSIS — J02.9 SORE THROAT: ICD-10-CM

## 2024-09-04 DIAGNOSIS — J06.9 VIRAL URI: Primary | ICD-10-CM

## 2024-09-04 LAB
GROUP A STREP ANTIGEN, POC: POSITIVE
VALID INTERNAL CONTROL, POC: YES

## 2024-09-04 RX ORDER — IBUPROFEN 200 MG
200 TABLET ORAL EVERY 6 HOURS PRN
COMMUNITY

## 2024-09-04 RX ORDER — AMOXICILLIN 500 MG/1
500 CAPSULE ORAL 2 TIMES DAILY
Qty: 20 CAPSULE | Refills: 0 | Status: SHIPPED | OUTPATIENT
Start: 2024-09-04 | End: 2024-09-14

## 2024-09-04 ASSESSMENT — PATIENT HEALTH QUESTIONNAIRE - PHQ9
SUM OF ALL RESPONSES TO PHQ QUESTIONS 1-9: 0
2. FEELING DOWN, DEPRESSED OR HOPELESS: NOT AT ALL
1. LITTLE INTEREST OR PLEASURE IN DOING THINGS: NOT AT ALL
SUM OF ALL RESPONSES TO PHQ9 QUESTIONS 1 & 2: 0
SUM OF ALL RESPONSES TO PHQ QUESTIONS 1-9: 0

## 2024-09-04 NOTE — PROGRESS NOTES
[facility]  [address] [place] [address]   Office [phone], Fax [phone]      Chief Complaint:     Chief Complaint   Patient presents with    Sore Throat     Since Monday, feels she have been exposed to COVID       Subjective:   HPI:  Maria D Clemens is a 38 y.o. female that presents to clinic for:    #Sore throat   - Present x 3 days   - Associated headache, cough  - Denies fevers, abdominal pain, shortness of breath, palpitations  - Sick contact described as \"exposed to Covid-19 at [employer] this Sunday\"  - Has not taken a home covid test   - History of strep throat     ROS:   Negative other than mentioned in HPI      Please note that this dictation was completed with [employer], the [email or web address] voice recognition software.  Quite often unanticipated grammatical, syntax, homophones, and other interpretive errors are inadvertently transcribed by the computer software.  Please disregard these errors.  Please excuse any errors that have escaped final proofreading.     Past medical history, social history, medications, and allergies personally reviewed.  Past Medical History:   Diagnosis Date    Breast disorder     Breast mass, right 8/2015    Other hyperlipidemia 6/25/2020     Social History     Socioeconomic History    Marital status:      Spouse name: Not on file    Number of children: Not on file    Years of education: Not on file    Highest education level: Not on file   Occupational History    Not on file   Tobacco Use    Smoking status: Never     Passive exposure: Never    Smokeless tobacco: Never   Vaping Use    Vaping status: Never Used   Substance and Sexual Activity    Alcohol use: Never    Drug use: Never    Sexual activity: Not Currently     Birth control/protection: Injection, I.U.D.     Comment:    Other Topics Concern    Not on file   Social History Narrative         ** Merged History Encounter **     Social Determinants of Health

## 2024-09-04 NOTE — PROGRESS NOTES
Identified pt with two pt identifiers(name and ). Reviewed record in preparation for visit and have obtained necessary documentation.  Chief Complaint   Patient presents with    Sore Throat     Since Monday, feels she have been exposed to COVID        Health Maintenance Due   Topic    Varicella vaccine (1 of 2 - 13+ 2-dose series)    Hepatitis B vaccine (1 of 3 - 19+ 3-dose series)    DTaP/Tdap/Td vaccine (1 - Tdap)    Cervical cancer screen     Flu vaccine (1)    COVID-19 Vaccine (3 - 2023-24 season)       Vitals:    24 1838   Resp: 18   Weight: 81.6 kg (180 lb)   Height: 1.575 m (5' 2\")         \"Have you been to the ER, urgent care clinic since your last visit?  Hospitalized since your last visit?\"    NO    “Have you seen or consulted any other [facility] providers [facility]?”    NO     “Have you had a pap smear?”    NO    Date of last Cervical Cancer screen (HPV or PAP): 2014             Click Here for Release of Records Request     This patient is accompanied in the office by her self.  I have received verbal consent from Maria D Clemens to discuss any/all medical information while they are present in the room.

## 2024-09-05 NOTE — PROGRESS NOTES
I discussed the findings, assessment and plan with the resident and agree with the resident's findings and plan as documented in the resident's note.

## 2025-05-29 ENCOUNTER — OFFICE VISIT (OUTPATIENT)
Age: 40
End: 2025-05-29
Payer: COMMERCIAL

## 2025-05-29 VITALS
OXYGEN SATURATION: 98 % | SYSTOLIC BLOOD PRESSURE: 107 MMHG | HEART RATE: 75 BPM | WEIGHT: 186 LBS | DIASTOLIC BLOOD PRESSURE: 69 MMHG | BODY MASS INDEX: 34.23 KG/M2 | TEMPERATURE: 98.3 F | HEIGHT: 62 IN

## 2025-05-29 DIAGNOSIS — M25.572 ACUTE LEFT ANKLE PAIN: ICD-10-CM

## 2025-05-29 DIAGNOSIS — M79.672 FOOT PAIN, LEFT: Primary | ICD-10-CM

## 2025-05-29 PROCEDURE — 99213 OFFICE O/P EST LOW 20 MIN: CPT | Performed by: STUDENT IN AN ORGANIZED HEALTH CARE EDUCATION/TRAINING PROGRAM

## 2025-05-29 ASSESSMENT — PATIENT HEALTH QUESTIONNAIRE - PHQ9
SUM OF ALL RESPONSES TO PHQ QUESTIONS 1-9: 0
2. FEELING DOWN, DEPRESSED OR HOPELESS: NOT AT ALL
1. LITTLE INTEREST OR PLEASURE IN DOING THINGS: NOT AT ALL
SUM OF ALL RESPONSES TO PHQ QUESTIONS 1-9: 0

## 2025-05-29 NOTE — PROGRESS NOTES
Maria D Clemens is a 39 y.o. female      Chief Complaint   Patient presents with    Leg Swelling     Left foot swelling/pain that migrates up to her knee after walking long shifts.       \"Have you been to the ER, urgent care clinic since your last visit?  Hospitalized since your last visit?\"    NO    “Have you seen or consulted any other health care providers outside of [facility] since your last visit?”    NO  Date of last Mammogram: 12/16/2021       Date of last Cervical Cancer screen (HPV or PAP): 8/14/2014             Click Here for Release of Records Request    Vitals:    05/29/25 0845   BP: 107/69   BP Site: Right Upper Arm   Patient Position: Sitting   BP Cuff Size: Large Adult   Pulse: 75   Temp: 98.3 °F (36.8 °C)   TempSrc: Oral   SpO2: 98%   Weight: 84.4 kg (186 lb)   Height: 1.575 m (5' 2.01\")           Medication Reconciliation Completed, changes notes. Please Update medication list.  
(VOLTAREN) 75 MG EC tablet Take 75 mg by mouth 2 times daily (with meals) (Patient not taking: Reported on 5/29/2025)      lidocaine (LIDODERM) 5 % Apply patch to the affected area for 12 hours a day and remove for 12 hours a day. (Patient not taking: Reported on 5/29/2025)      methocarbamol (ROBAXIN) 500 MG tablet Take 500 mg by mouth 3 times daily as needed (Patient not taking: Reported on 5/29/2025)      methylPREDNISolone (MEDROL DOSEPACK) 4 MG tablet Per dose pack instructions (Patient not taking: Reported on 5/29/2025)      tiZANidine (ZANAFLEX) 2 MG tablet Take 1 tablet by mouth nightly (Patient not taking: Reported on 5/29/2025)       No current facility-administered medications on file prior to visit.       No Known Allergies      Review of Systems:     Review of Systems as per HPI    Objective:     Vitals:    05/29/25 0845   BP: 107/69   BP Site: Right Upper Arm   Patient Position: Sitting   BP Cuff Size: Large Adult   Pulse: 75   Temp: 98.3 °F (36.8 °C)   TempSrc: Oral   SpO2: 98%   Weight: 84.4 kg (186 lb)   Height: 1.575 m (5' 2.01\")       Physical Exam:   General: NAD, well appearing  Resp: no increased WOB  Cardiac: color good, appears well perfused  Abdomen: no abdominal tenderness or distention  Extremities: no edema. Right foot is normal in appearance, no edema, full ROM. Mild TTP over plantar heel, no where else.      Recent Labs:  No results found for this or any previous visit (from the past 12 hours).    Assessment and Plan:     Maria D was seen today for leg swelling.    Diagnoses and all orders for this visit:    Foot pain, left  Suspect plantar fasciitis, provided exercises, rec'd NSAIDs. Will rule out bony etiologies w XR.  -     XR FOOT LEFT (MIN 3 VIEWS); Future  -     XR ANKLE LEFT (MIN 3 VIEWS); Future    Acute left ankle pain  -     XR FOOT LEFT (MIN 3 VIEWS); Future  -     XR ANKLE LEFT (MIN 3 VIEWS); Future            Follow up: 1 mo for wellness visit.    Omid De Leon MD    We

## 2025-06-12 ENCOUNTER — OFFICE VISIT (OUTPATIENT)
Age: 40
End: 2025-06-12
Payer: COMMERCIAL

## 2025-06-12 VITALS
HEART RATE: 79 BPM | RESPIRATION RATE: 16 BRPM | HEIGHT: 62 IN | OXYGEN SATURATION: 97 % | WEIGHT: 186 LBS | DIASTOLIC BLOOD PRESSURE: 64 MMHG | TEMPERATURE: 98.1 F | BODY MASS INDEX: 34.23 KG/M2 | SYSTOLIC BLOOD PRESSURE: 94 MMHG

## 2025-06-12 DIAGNOSIS — M79.672 FOOT PAIN, LEFT: Primary | ICD-10-CM

## 2025-06-12 DIAGNOSIS — M25.572 LEFT ANKLE PAIN, UNSPECIFIED CHRONICITY: ICD-10-CM

## 2025-06-12 PROCEDURE — 99214 OFFICE O/P EST MOD 30 MIN: CPT | Performed by: FAMILY MEDICINE

## 2025-06-12 RX ORDER — DUPILUMAB 300 MG/2ML
INJECTION, SOLUTION SUBCUTANEOUS
COMMUNITY
Start: 2025-05-27

## 2025-06-12 SDOH — ECONOMIC STABILITY: FOOD INSECURITY: WITHIN THE PAST 12 MONTHS, YOU WORRIED THAT YOUR FOOD WOULD RUN OUT BEFORE YOU GOT MONEY TO BUY MORE.: NEVER TRUE

## 2025-06-12 SDOH — ECONOMIC STABILITY: FOOD INSECURITY: WITHIN THE PAST 12 MONTHS, THE FOOD YOU BOUGHT JUST DIDN'T LAST AND YOU DIDN'T HAVE MONEY TO GET MORE.: NEVER TRUE

## 2025-06-12 ASSESSMENT — PATIENT HEALTH QUESTIONNAIRE - PHQ9
2. FEELING DOWN, DEPRESSED OR HOPELESS: NOT AT ALL
SUM OF ALL RESPONSES TO PHQ QUESTIONS 1-9: 0
1. LITTLE INTEREST OR PLEASURE IN DOING THINGS: NOT AT ALL
SUM OF ALL RESPONSES TO PHQ QUESTIONS 1-9: 0

## 2025-06-12 NOTE — PROGRESS NOTES
[facility]  [facility]   [facility]      Chief Complaint:   Chief Complaint   Patient presents with    Foot Pain       SUBJECTIVE:  Maria D Clemens is a 39 y.o. female who presents for left ankle and foot pain.  Pain is chronically intermittent.  She works at [employer] and pain is worse after standing and walking for a long time.  No injury or trauma.  She was seen in clinic about 2 weeks ago and radiographs of the foot and ankle showed no acute findings but did show mild DJD.  No current self treatment.     PMHx:  Past Medical History:   Diagnosis Date    Breast disorder     Breast mass, right 8/2015    Other hyperlipidemia 6/25/2020       Meds:   Current Outpatient Medications   Medication Sig Dispense Refill    DUPIXENT 300 MG/2ML SOAJ injection       ibuprofen (ADVIL;MOTRIN) 200 MG tablet Take 1 tablet by mouth every 6 hours as needed for Pain      levonorgestrel (MIRENA, 52 MG,) IUD 52 mg 1 each by IntraUTERine route once      guaiFENesin (ROBITUSSIN) 100 MG/5ML syrup Take 10 mLs by mouth 3 times daily as needed for Cough (Patient not taking: Reported on 8/5/2024) 200 mL 0    fluticasone (FLONASE) 50 MCG/ACT nasal spray 2 sprays by Each Nostril route daily (Patient not taking: Reported on 8/5/2024) 16 g 0    albuterol sulfate HFA (VENTOLIN HFA) 108 (90 Base) MCG/ACT inhaler Inhale 2 puffs into the lungs 4 times daily as needed for Wheezing (Patient not taking: Reported on 6/12/2025) 18 g 0    naproxen (NAPROSYN) 500 MG tablet Take 1 tablet by mouth 2 times daily (with meals) (Patient not taking: Reported on 3/14/2024) 60 tablet 0    meloxicam (MOBIC) 15 MG tablet Take 1 tablet by mouth daily (Patient not taking: Reported on 2/14/2024) 30 tablet 0    diclofenac sodium (VOLTAREN) 1 % GEL Apply 4 g topically 4 times daily (Patient not taking: Reported on 1/11/2024) 100 g 1    diclofenac (VOLTAREN) 75 MG EC tablet Take 75 mg by mouth 2 times daily

## 2025-06-12 NOTE — PROGRESS NOTES
Maria D Clemens is a 39 y.o. female      Chief Complaint   Patient presents with    Foot Pain   Left foot  - swelling  - no injuries to report  - cold therapy sometimes    \"Have you been to the ER, urgent care clinic since your last visit?  Hospitalized since your last visit?\"    NO    “Have you seen or consulted any other health care providers outside of [facility] since your last visit?”    NO       Have you had a mammogram?”   NO    Date of last Mammogram: 12/16/2021      “Have you had a pap smear?”    NO    Date of last Cervical Cancer screen (HPV or PAP): 8/14/2014            Vitals:    06/12/25 0836   BP: 94/64   BP Site: Left Upper Arm   Patient Position: Sitting   BP Cuff Size: Large Adult   Pulse: 79   Resp: 16   Temp: 98.1 °F (36.7 °C)   TempSrc: Oral   SpO2: 97%   Weight: 84.4 kg (186 lb)   Height: 1.575 m (5' 2.01\")            Health Maintenance Due   Topic Date Due    Varicella vaccine (1 of 2 - 13+ 2-dose series) Never done    Hepatitis B vaccine (1 of 3 - 19+ 3-dose series) Never done    DTaP/Tdap/Td vaccine (1 - Tdap) Never done    Cervical cancer screen  08/14/2019    COVID-19 Vaccine (3 - 2024-25 season) 09/01/2024    Breast cancer screen  10/15/2024         Medication Reconciliation completed, changes noted.  Please  Update medication list.     Detail Level: Zone Detail Level: Generalized